# Patient Record
Sex: MALE | Race: WHITE | NOT HISPANIC OR LATINO | Employment: OTHER | URBAN - NONMETROPOLITAN AREA
[De-identification: names, ages, dates, MRNs, and addresses within clinical notes are randomized per-mention and may not be internally consistent; named-entity substitution may affect disease eponyms.]

---

## 2017-03-13 ENCOUNTER — COMMUNICATION - GICH (OUTPATIENT)
Dept: SURGERY | Facility: OTHER | Age: 62
End: 2017-03-13

## 2018-02-14 ENCOUNTER — DOCUMENTATION ONLY (OUTPATIENT)
Dept: FAMILY MEDICINE | Facility: OTHER | Age: 63
End: 2018-02-14

## 2018-02-14 RX ORDER — CHLORAL HYDRATE 500 MG
1 CAPSULE ORAL 3 TIMES DAILY
COMMUNITY
Start: 2013-03-28 | End: 2021-01-26 | Stop reason: DRUGHIGH

## 2018-07-23 NOTE — PROGRESS NOTES
Patient Information     Patient Name  Pepito Harman MRN  [id number] Sex  Male   1955      Letter by Rich Wheeler MD at      Author:  Rich Wheeler MD Service:  (none) Author Type:  (none)    Filed:   Encounter Date:  3/13/2017 Status:  (Other)           Pepito Harman  [id number] [address] [address]          2017    Dear Mr. Harman:    It has come to our attention that you are due for a repeat colonoscopy.  According to our records, your last colonoscopy was done on 3/23/07.  It  is time for your repeat colonoscopy.  Please contact our office at [phone] and we will be happy to set up this colonoscopy for you.  If you have had a colonoscopy since your last one with us, please let us know so we can update our records.      Sincerely,       [facility]      Reviewed and electronically signed by provider

## 2018-10-09 ENCOUNTER — OFFICE VISIT (OUTPATIENT)
Dept: FAMILY MEDICINE | Facility: OTHER | Age: 63
End: 2018-10-09
Payer: COMMERCIAL

## 2018-10-09 VITALS
DIASTOLIC BLOOD PRESSURE: 80 MMHG | WEIGHT: 208.2 LBS | BODY MASS INDEX: 28.23 KG/M2 | HEART RATE: 97 BPM | OXYGEN SATURATION: 93 % | TEMPERATURE: 100.3 F | SYSTOLIC BLOOD PRESSURE: 130 MMHG
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
## 2018-10-09 PROCEDURE — 99202 OFFICE O/P NEW SF 15 MIN: CPT | Performed by: PHYSICIAN ASSISTANT

## 2018-10-09 RX ORDER — DOXYCYCLINE 100 MG/1
100 CAPSULE ORAL 2 TIMES DAILY
Qty: 20 CAPSULE | Refills: 0 | Status: SHIPPED | OUTPATIENT
Start: 2018-10-09 | End: 2019-02-07

## 2018-10-09 NOTE — MR AVS SNAPSHOT
After Visit Summary   10/9/2018    Pepito Harman    MRN: [id number]           Patient Information     Date Of Birth          1955        Visit Information        Provider Department      10/9/2018 7:45 PM Maren Florian PA-C [facility]        Today's Diagnoses     Tick bite, initial encounter    -  1      Care Instructions    Deer tick bite  Posterior left thigh  Will treat as a possible cellulitis versus erythema migrans (Lyme disease)  Doxycycline 100 mg oral tablet, one tablet twice daily for 10 days. Take with food.   Wash area of tick bite with warm soapy water, cover with topical antibiotic ointment 2-3 times/ daily for 7-10 days  Follow up with PCP if symptoms persist or worsen  Seek immediate care for    Current symptoms get worse    Unexplained fever, neck pain or stiffness, or headache    Arm, leg or facial weakness    Joint pain or swelling    Numbness and tingling in the arms or legs    Confusion or memory loss    Irregular or rapid heartbeat      Red areas that spread    Swelling or pain that gets worse    Fluid leaking from the skin (pus)    Fever higher of 100.4  F (38.0  C) or higher after 2 days on antibiotics          Lyme Disease  Lyme disease is caused by bacteria. The infection is most often passed during the bite of a deer tick. The tick is very small, so many people with Lyme disease do not know they have been bitten. Tests for Lyme disease are not always accurate early in the disease. If the disease is suspected, treatment may begin before testing confirms the infection. A long course of antibiotics is the standard treatment.  If untreated, Lyme disease can worsen and full-body symptoms can develop          Early local symptoms may appear within a few days to a month after the tick bite. These symptoms may include a round, red rash that looks like a bull's-eye target with darker outer ring and a darker center. There may fever, chills, fatigue, body  aches, and headache. In time, the rash goes away, even without treatment. That doesn't mean the infection has gone away, however. In some cases, early local symptoms never develop.    Early disseminated symptoms may appear weeks to months after the bite. These can include muscle aches, fatigue, fever, headache, stiff neck, and joint pain and swelling.    Late-stage symptoms include weakness in an arm, leg or one side of the face, headache, fever, and numbness and tingling in the arms or legs, confusion, and memory loss.  Testing is done for the presence of the bacteria. When the infection is treated early, it can be cured. In some cases, a second or third course of antibiotics may be needed. Be sure to follow your healthcare providers directions about treatment.  Home care  If oral antibiotics have been prescribed, take them exactly as directed until they are completely gone. Do not stop taking them until you have taken the full course or your healthcare provider has told you to stop.  Ask your healthcare provider about taking over-the-counter medicines to control symptoms such as aches and fever.  Follow-up care  Follow up with your healthcare provider as advised. Be sure to return for follow-up testing as directed to be sure the infection has been treated.  When to seek medical advice  Call your healthcare provider right away if any of the following occur:    Current symptoms get worse    Unexplained fever, neck pain or stiffness, or headache    Arm, leg or facial weakness    Joint pain or swelling    Numbness and tingling in the arms or legs    Confusion or memory loss    Irregular or rapid heartbeat  Date Last Reviewed: 9/25/2015 2000-2017 [employer]. [address] [place] [address], [place] [address]. All rights reserved. This information is not intended as a substitute for professional medical care. Always follow your healthcare professional's instructions.        Lyme Disease  Lyme disease is caused  by bacteria. The infection is most often passed during the bite of a deer tick. The tick is very small, so many people with Lyme disease do not know they have been bitten. Tests for Lyme disease are not always accurate early in the disease. If the disease is suspected, treatment may begin before testing confirms the infection. A long course of antibiotics is the standard treatment.  If untreated, Lyme disease can worsen and full-body symptoms can develop          Early local symptoms may appear within a few days to a month after the tick bite. These symptoms may include a round, red rash that looks like a bull's-eye target with darker outer ring and a darker center. There may fever, chills, fatigue, body aches, and headache. In time, the rash goes away, even without treatment. That doesn't mean the infection has gone away, however. In some cases, early local symptoms never develop.    Early disseminated symptoms may appear weeks to months after the bite. These can include muscle aches, fatigue, fever, headache, stiff neck, and joint pain and swelling.    Late-stage symptoms include weakness in an arm, leg or one side of the face, headache, fever, and numbness and tingling in the arms or legs, confusion, and memory loss.  Testing is done for the presence of the bacteria. When the infection is treated early, it can be cured. In some cases, a second or third course of antibiotics may be needed. Be sure to follow your healthcare providers directions about treatment.  Home care  If oral antibiotics have been prescribed, take them exactly as directed until they are completely gone. Do not stop taking them until you have taken the full course or your healthcare provider has told you to stop.  Ask your healthcare provider about taking over-the-counter medicines to control symptoms such as aches and fever.  Follow-up care  Follow up with your healthcare provider as advised. Be sure to return for follow-up testing as directed  to be sure the infection has been treated.  When to seek medical advice  Call your healthcare provider right away if any of the following occur:    Current symptoms get worse    Unexplained fever, neck pain or stiffness, or headache    Arm, leg or facial weakness    Joint pain or swelling    Numbness and tingling in the arms or legs    Confusion or memory loss    Irregular or rapid heartbeat  Date Last Reviewed: 9/25/2015 2000-2017 [employer]. [address] [place] [address], [place] [address]. All rights reserved. This information is not intended as a substitute for professional medical care. Always follow your healthcare professional's instructions.        Cellulitis  Cellulitis is an infection of the deep layers of skin. A break in the skin, such as a cut or scratch, can let bacteria under the skin. If the bacteria get to deep layers of the skin, it can be serious. If not treated, cellulitis can get into the bloodstream and lymph nodes. The infection can then spread throughout the body. This causes serious illness.  Cellulitis causes the affected skin to become red, swollen, warm, and sore. The reddened areas have a visible border. An open sore may leak fluid (pus). You may have a fever, chills, and pain.  Cellulitis is treated with antibiotics taken for 7 to 10 days. An open sore may be cleaned and covered with cool wet gauze. Symptoms should get better 1 to 2 days after treatment is started. Make sure to take all the antibiotics for the full number of days until they are gone. Keep taking the medicine even if your symptoms go away.  Home care  Follow these tips:    Limit the use of the part of your body with cellulitis.     If the infection is on your leg, keep your leg raised while sitting. This will help to reduce swelling.    Take all of the antibiotic medicine exactly as directed until it is gone. Do not miss any doses, especially during the first 7 days. Don t stop taking the medicine when your  symptoms get better.    Keep the affected area clean and dry.    Wash your hands with soap and warm water before and after touching your skin. Anyone else who touches your skin should also wash his or her hands. Don't share towels.  Follow-up care  Follow up with your healthcare provider, or as advised. If your infection does not go away on the first antibiotic, your healthcare provider will prescribe a different one.  When to seek medical advice  Call your healthcare provider right away if any of these occur:    Red areas that spread    Swelling or pain that gets worse    Fluid leaking from the skin (pus)    Fever higher of 100.4  F (38.0  C) or higher after 2 days on antibiotics  Date Last Reviewed: 9/1/2016 2000-2017 [employer]. [address] [place] [address], [place] [address]. All rights reserved. This information is not intended as a substitute for professional medical care. Always follow your healthcare professional's instructions.                Follow-ups after your visit        Follow-up notes from your care team     Return if symptoms worsen or fail to improve.      Who to contact     If you have questions or need follow up information about today's clinic visit or your schedule please contact [facility] directly at [phone].  Normal or non-critical lab and imaging results will be communicated to you by MyChart, letter or phone within 4 business days after the clinic has received the results. If you do not hear from us within 7 days, please contact the clinic through MyChart or phone. If you have a critical or abnormal lab result, we will notify you by phone as soon as possible.  Submit refill requests through [employer] or call your pharmacy and they will forward the refill request to us. Please allow 3 business days for your refill to be completed.          Additional Information About Your Visit        Care EveryWhere ID     This is your Care EveryWhere ID. This could  be used by other organizations to access your [place] medical records  [id number]        Your Vitals Were     Pulse Temperature Pulse Oximetry BMI (Body Mass Index)          97 100.3  F (37.9  C) (Tympanic) 93% 28.23 kg/m2         Blood Pressure from Last 3 Encounters:   10/09/18 130/80   03/17/15 130/90   12/19/14 120/80    Weight from Last 3 Encounters:   10/09/18 208 lb 3.2 oz (94.4 kg)   03/17/15 210 lb (95.3 kg)   11/21/14 201 lb 14.4 oz (91.6 kg)              Today, you had the following     No orders found for display         Today's Medication Changes          These changes are accurate as of 10/9/18  8:15 PM.  If you have any questions, ask your nurse or doctor.               Start taking these medicines.        Dose/Directions    doxycycline monohydrate 100 MG capsule   Used for:  Tick bite, initial encounter   Started by:  Maren Florian PA-C        Dose:  100 mg   Take 1 capsule (100 mg) by mouth 2 times daily for 10 days   Quantity:  20 capsule   Refills:  0            Where to get your medicines      These medications were sent to [id number] - [place], [place] - [address]  [address], [facility] [address]     Phone:  [phone]     doxycycline monohydrate 100 MG capsule                Primary Care Provider Office Phone # Fax #    Bo Cortes [phone]       [address] [facility] [address]        Equal Access to Services     [facility] AH: Hadii aad ku hadasho Soomaali, waaxda luqadaha, qaybta kaalmada adeegyada, catalina roach. So [facility] [place] [facility] [phone].    ATENCIÓN: Si habla español, tiene a vasquez disposición servicios gratuitos de asistencia lingüística. Llame al [phone].    We comply with applicable federal civil rights laws and [place] laws. We do not discriminate on the basis of race, color, national origin, age, disability, sex, sexual orientation, or gender identity.             Thank you!     Thank you for choosing [facility]  for your care. Our goal is always to provide you with excellent care. Hearing back from our patients is one way we can continue to improve our services. Please take a few minutes to complete the written survey that you may receive in the mail after your visit with us. Thank you!             Your Updated Medication List - Protect others around you: Learn how to safely use, store and throw away your medicines at www.disposemymeds.org.          This list is accurate as of 10/9/18  8:15 PM.  Always use your most recent med list.                   Brand Name Dispense Instructions for use Diagnosis    ASPIRIN 81 PO      Take 81 mg by mouth daily with food        doxycycline monohydrate 100 MG capsule     20 capsule    Take 1 capsule (100 mg) by mouth 2 times daily for 10 days    Tick bite, initial encounter       fish oil-omega-3 fatty acids 1000 MG capsule      Take 1 capsule by mouth

## 2018-10-10 NOTE — PATIENT INSTRUCTIONS
Deer tick bite  Posterior left thigh  Will treat as a possible cellulitis versus erythema migrans (Lyme disease)  Doxycycline 100 mg oral tablet, one tablet twice daily for 10 days. Take with food.   Wash area of tick bite with warm soapy water, cover with topical antibiotic ointment 2-3 times/ daily for 7-10 days  Follow up with PCP if symptoms persist or worsen  Seek immediate care for    Current symptoms get worse    Unexplained fever, neck pain or stiffness, or headache    Arm, leg or facial weakness    Joint pain or swelling    Numbness and tingling in the arms or legs    Confusion or memory loss    Irregular or rapid heartbeat      Red areas that spread    Swelling or pain that gets worse    Fluid leaking from the skin (pus)    Fever higher of 100.4  F (38.0  C) or higher after 2 days on antibiotics          Lyme Disease  Lyme disease is caused by bacteria. The infection is most often passed during the bite of a deer tick. The tick is very small, so many people with Lyme disease do not know they have been bitten. Tests for Lyme disease are not always accurate early in the disease. If the disease is suspected, treatment may begin before testing confirms the infection. A long course of antibiotics is the standard treatment.  If untreated, Lyme disease can worsen and full-body symptoms can develop          Early local symptoms may appear within a few days to a month after the tick bite. These symptoms may include a round, red rash that looks like a bull's-eye target with darker outer ring and a darker center. There may fever, chills, fatigue, body aches, and headache. In time, the rash goes away, even without treatment. That doesn't mean the infection has gone away, however. In some cases, early local symptoms never develop.    Early disseminated symptoms may appear weeks to months after the bite. These can include muscle aches, fatigue, fever, headache, stiff neck, and joint pain and swelling.    Late-stage  symptoms include weakness in an arm, leg or one side of the face, headache, fever, and numbness and tingling in the arms or legs, confusion, and memory loss.  Testing is done for the presence of the bacteria. When the infection is treated early, it can be cured. In some cases, a second or third course of antibiotics may be needed. Be sure to follow your healthcare providers directions about treatment.  Home care  If oral antibiotics have been prescribed, take them exactly as directed until they are completely gone. Do not stop taking them until you have taken the full course or your healthcare provider has told you to stop.  Ask your healthcare provider about taking over-the-counter medicines to control symptoms such as aches and fever.  Follow-up care  Follow up with your healthcare provider as advised. Be sure to return for follow-up testing as directed to be sure the infection has been treated.  When to seek medical advice  Call your healthcare provider right away if any of the following occur:    Current symptoms get worse    Unexplained fever, neck pain or stiffness, or headache    Arm, leg or facial weakness    Joint pain or swelling    Numbness and tingling in the arms or legs    Confusion or memory loss    Irregular or rapid heartbeat  Date Last Reviewed: 9/25/2015 2000-2017 [employer]. [address] [place] [address]. All rights reserved. This information is not intended as a substitute for professional medical care. Always follow your healthcare professional's instructions.        Lyme Disease  Lyme disease is caused by bacteria. The infection is most often passed during the bite of a deer tick. The tick is very small, so many people with Lyme disease do not know they have been bitten. Tests for Lyme disease are not always accurate early in the disease. If the disease is suspected, treatment may begin before testing confirms the infection. A long course of antibiotics is the  standard treatment.  If untreated, Lyme disease can worsen and full-body symptoms can develop          Early local symptoms may appear within a few days to a month after the tick bite. These symptoms may include a round, red rash that looks like a bull's-eye target with darker outer ring and a darker center. There may fever, chills, fatigue, body aches, and headache. In time, the rash goes away, even without treatment. That doesn't mean the infection has gone away, however. In some cases, early local symptoms never develop.    Early disseminated symptoms may appear weeks to months after the bite. These can include muscle aches, fatigue, fever, headache, stiff neck, and joint pain and swelling.    Late-stage symptoms include weakness in an arm, leg or one side of the face, headache, fever, and numbness and tingling in the arms or legs, confusion, and memory loss.  Testing is done for the presence of the bacteria. When the infection is treated early, it can be cured. In some cases, a second or third course of antibiotics may be needed. Be sure to follow your healthcare providers directions about treatment.  Home care  If oral antibiotics have been prescribed, take them exactly as directed until they are completely gone. Do not stop taking them until you have taken the full course or your healthcare provider has told you to stop.  Ask your healthcare provider about taking over-the-counter medicines to control symptoms such as aches and fever.  Follow-up care  Follow up with your healthcare provider as advised. Be sure to return for follow-up testing as directed to be sure the infection has been treated.  When to seek medical advice  Call your healthcare provider right away if any of the following occur:    Current symptoms get worse    Unexplained fever, neck pain or stiffness, or headache    Arm, leg or facial weakness    Joint pain or swelling    Numbness and tingling in the arms or legs    Confusion or memory  loss    Irregular or rapid heartbeat  Date Last Reviewed: 9/25/2015 2000-2017 [employer]. [address] [place] [address], [place] [address]. All rights reserved. This information is not intended as a substitute for professional medical care. Always follow your healthcare professional's instructions.        Cellulitis  Cellulitis is an infection of the deep layers of skin. A break in the skin, such as a cut or scratch, can let bacteria under the skin. If the bacteria get to deep layers of the skin, it can be serious. If not treated, cellulitis can get into the bloodstream and lymph nodes. The infection can then spread throughout the body. This causes serious illness.  Cellulitis causes the affected skin to become red, swollen, warm, and sore. The reddened areas have a visible border. An open sore may leak fluid (pus). You may have a fever, chills, and pain.  Cellulitis is treated with antibiotics taken for 7 to 10 days. An open sore may be cleaned and covered with cool wet gauze. Symptoms should get better 1 to 2 days after treatment is started. Make sure to take all the antibiotics for the full number of days until they are gone. Keep taking the medicine even if your symptoms go away.  Home care  Follow these tips:    Limit the use of the part of your body with cellulitis.     If the infection is on your leg, keep your leg raised while sitting. This will help to reduce swelling.    Take all of the antibiotic medicine exactly as directed until it is gone. Do not miss any doses, especially during the first 7 days. Don t stop taking the medicine when your symptoms get better.    Keep the affected area clean and dry.    Wash your hands with soap and warm water before and after touching your skin. Anyone else who touches your skin should also wash his or her hands. Don't share towels.  Follow-up care  Follow up with your healthcare provider, or as advised. If your infection does not go away on the first  antibiotic, your healthcare provider will prescribe a different one.  When to seek medical advice  Call your healthcare provider right away if any of these occur:    Red areas that spread    Swelling or pain that gets worse    Fluid leaking from the skin (pus)    Fever higher of 100.4  F (38.0  C) or higher after 2 days on antibiotics  Date Last Reviewed: 9/1/2016 2000-2017 [employer]. [address] [place] [address]. All rights reserved. This information is not intended as a substitute for professional medical care. Always follow your healthcare professional's instructions.

## 2018-10-10 NOTE — NURSING NOTE
Patient presents to clinic today for possible lyme's. patient states his wife pulled off a tick a week ago. Patient's left leg is swollen and has a large area of irritation on it.   Snehal Gandara CMA..............10/9/2018........7:52 PM    Medication Reconciliation: complete    Snehal Gandara CMA

## 2018-10-10 NOTE — PROGRESS NOTES
SUBJECTIVE:  Pepito Harman is a 63 year old male who is here because of a tick bite.   The tick was first noticed on the posterior distal left thigh. Onset 1 week ago. Tick was very small and was removed.     Associated symptoms:  Rash: on back of thigh where tick was removed  Fever: temperature 100.3 in clinic, denies chills and sweats  Other symptoms:   Headache negative  Sore throat no  Fatigue no  Nausea/vomiting no  Muscle aches - yes, he thinks this is from his job, not a new symptom.   Joint pain no  Swollen lymph glands no  Stiff neck no      ROS:  As above    OBJECTIVE:  Nontoxic male in no acute distress.  /80  Pulse 97  Temp 100.3  F (37.9  C) (Tympanic)  Wt 208 lb 3.2 oz (94.4 kg)  SpO2 93%  BMI 28.23 kg/m2    Rash description:  Posterior left thigh - 9 x 6 cm area of erythema. Area is indurated and warm. 4 cm central area that is also erythematous, raised, bumpy. Mildly TTP.   Neurological: Alert, normal gait, cranial nerves grossly intact. Strength U/L extremity grossly intact.       ASSESSMENT / IMPRESSION:  (W57.XXXA) Tick bite, initial encounter  (primary encounter diagnosis)    Plan: doxycycline monohydrate 100 MG capsule    Deer tick bite  Posterior left thigh  Will treat as a possible cellulitis versus erythema migrans (Lyme disease)  Doxycycline 100 mg oral tablet, one tablet twice daily for 10 days. Take with food.   Wash area of tick bite with warm soapy water, cover with topical antibiotic ointment 2-3 times/ daily for 7-10 days  Follow up with PCP if symptoms persist or worsen  Patient received verbal and written instruction including review of warning signs    aMren Florian PA-C on 10/9/2018 at 8:44 PM

## 2019-02-08 ENCOUNTER — OFFICE VISIT (OUTPATIENT)
Dept: INTERNAL MEDICINE | Facility: OTHER | Age: 64
End: 2019-02-08
Attending: INTERNAL MEDICINE
Payer: COMMERCIAL

## 2019-02-08 VITALS
TEMPERATURE: 96 F | DIASTOLIC BLOOD PRESSURE: 80 MMHG | HEART RATE: 68 BPM | SYSTOLIC BLOOD PRESSURE: 134 MMHG | RESPIRATION RATE: 16 BRPM | WEIGHT: 203 LBS | HEIGHT: 72 IN | BODY MASS INDEX: 27.5 KG/M2

## 2019-02-08 DIAGNOSIS — E78.1 HIGH TRIGLYCERIDES: ICD-10-CM

## 2019-02-08 DIAGNOSIS — E78.2 MIXED HYPERLIPIDEMIA: Primary | ICD-10-CM

## 2019-02-08 PROCEDURE — 99214 OFFICE O/P EST MOD 30 MIN: CPT | Performed by: INTERNAL MEDICINE

## 2019-02-08 RX ORDER — ATORVASTATIN CALCIUM 20 MG/1
20 TABLET, FILM COATED ORAL DAILY
Qty: 90 TABLET | Refills: 3 | Status: SHIPPED | OUTPATIENT
Start: 2019-02-08 | End: 2020-01-27

## 2019-02-08 RX ORDER — OMEGA-3-ACID ETHYL ESTERS 1 G/1
2 CAPSULE, LIQUID FILLED ORAL 2 TIMES DAILY
Qty: 360 CAPSULE | Refills: 3 | Status: SHIPPED | OUTPATIENT
Start: 2019-02-08 | End: 2020-02-13

## 2019-02-08 ASSESSMENT — ENCOUNTER SYMPTOMS
DYSURIA: 0
FEVER: 0
ARTHRALGIAS: 0
PALPITATIONS: 0
DIZZINESS: 0
COUGH: 0
AGITATION: 0
SHORTNESS OF BREATH: 0
LIGHT-HEADEDNESS: 0
WHEEZING: 0
WOUND: 0
ABDOMINAL PAIN: 0
DIARRHEA: 0
MYALGIAS: 0
HEMATURIA: 0
NAUSEA: 0
CONFUSION: 0
BRUISES/BLEEDS EASILY: 0
VOMITING: 0
CHILLS: 0

## 2019-02-08 ASSESSMENT — ANXIETY QUESTIONNAIRES
7. FEELING AFRAID AS IF SOMETHING AWFUL MIGHT HAPPEN: NOT AT ALL
1. FEELING NERVOUS, ANXIOUS, OR ON EDGE: NOT AT ALL
3. WORRYING TOO MUCH ABOUT DIFFERENT THINGS: NOT AT ALL
IF YOU CHECKED OFF ANY PROBLEMS ON THIS QUESTIONNAIRE, HOW DIFFICULT HAVE THESE PROBLEMS MADE IT FOR YOU TO DO YOUR WORK, TAKE CARE OF THINGS AT HOME, OR GET ALONG WITH OTHER PEOPLE: NOT DIFFICULT AT ALL
2. NOT BEING ABLE TO STOP OR CONTROL WORRYING: NOT AT ALL
6. BECOMING EASILY ANNOYED OR IRRITABLE: NOT AT ALL
GAD7 TOTAL SCORE: 0
5. BEING SO RESTLESS THAT IT IS HARD TO SIT STILL: NOT AT ALL

## 2019-02-08 ASSESSMENT — PATIENT HEALTH QUESTIONNAIRE - PHQ9
SUM OF ALL RESPONSES TO PHQ QUESTIONS 1-9: 0
5. POOR APPETITE OR OVEREATING: NOT AT ALL

## 2019-02-08 ASSESSMENT — PAIN SCALES - GENERAL: PAINLEVEL: NO PAIN (0)

## 2019-02-08 ASSESSMENT — MIFFLIN-ST. JEOR: SCORE: 1754.83

## 2019-02-08 NOTE — PATIENT INSTRUCTIONS
To help with weight loss and improve blood sugar control....    -- Try to avoid Carbohydrates as much as possible -- breads, pasta, baked goods, cakes, oatmeal, cold cereal, potatoes.   These are turned to sugar in one metabolic conversion, cause insulin secretion and increased fat deposition / weight gain.      -- Eat more lean meats, proteins, eggs, nuts, vegetables.       1. Mixed hyperlipidemia  START:   - atorvastatin (LIPITOR) 20 MG tablet; Take 1 tablet (20 mg) by mouth daily  Dispense: 90 tablet; Refill: 3  - omega-3 acid ethyl esters (LOVAZA) 1 g capsule; Take 2 capsules (2 g) by mouth 2 times daily  Dispense: 360 capsule; Refill: 3    2. High triglycerides  START:   - omega-3 acid ethyl esters (LOVAZA) 1 g capsule; Take 2 capsules (2 g) by mouth 2 times daily  Dispense: 360 capsule; Refill: 3    -- consider plant based Omega-3.. OTC okay.       Can recheck labs in 2 to 3 months. (Lab only).      After discussion of the treatment of hyperlipidemia, a statin-drug is chosen; prescription for Lipitor once daily is written.     The patient is informed that this type of drug is usually highly effective to lower LDL cholesterol and is usually very well tolerated. However, statin-type drugs can potentially injure theliver. Blood tests will be required every 3 months for the first 6 months of therapy, and at 6-12 month intervals thereafter.  Damage to the liver, if detected early, can be reversed by stopping the drug.  Be alert forpersistent nausea, abdominal pain, or yellow jaundice, and report such to me directly. Statin drugs may also cause skeletal muscle injury in rare cases. Be alert for pronounced persistent diffuse muscle pain and discontinuethe drug immediately should such symptoms develop.     Statins can cause myalgias or muscle aches, it is rare to have severe muscle aches --- mild aches/pains are more common.  -- Approximately 70% of the country has low vitamin D levels.  -- If you develop aches and  pains in the muscles after starting Statin medications; Recommend discontinuation of the statin temporarily, start taking Vitamin D3 - 2000 up to 5000 units daily for 2-4 weeks, then restart the Statin medication, but just 2 or 3 times weekly - increase statin to every evening if tolerated.    Nearly 70% of people are able to tolerate statin medications, once their Vitamin D levels are normalized.    - Coenzyme Q10 (CO Q-10) 200 to 500 mg capsule (400 to 500 mg daily)    - All-cause cardiovascular mortality was lower, in people taking this medication while on cholesterol medication.     -May also help improve or prevent muscle aches/myalgias from Statin medications.      Return in approximately 1 year, or sooner as needed for follow-up with Dr. Cortes.  - Annual Follow-up / Physical    Clinic : [phone]  Appointment line: [phone]

## 2019-02-08 NOTE — PROGRESS NOTES
"Nursing Notes:   Lou Pacheco LPN  2/8/2019  9:23 AM  Signed  Patient presents to the clinic for medication management.    Chief Complaint   Patient presents with     Recheck Medication       Initial /80 (BP Location: Right arm, Patient Position: Sitting, Cuff Size: Adult Regular)   Pulse 68   Temp 96  F (35.6  C) (Tympanic)   Resp 16   Ht 1.822 m (5' 11.75\")   Wt 92.1 kg (203 lb)   BMI 27.72 kg/m    Estimated body mass index is 27.72 kg/m  as calculated from the following:    Height as of this encounter: 1.822 m (5' 11.75\").    Weight as of this encounter: 92.1 kg (203 lb).  Medication Reconciliation: complete    Lou Pacheco LPN    Nursing note reviewed with patient.  Accuracy and completeness verified.   Mr. Harman is a 64 year old male who:  Patient presents with:  Recheck Medication      ICD-10-CM    1. Mixed hyperlipidemia E78.2 atorvastatin (LIPITOR) 20 MG tablet     omega-3 acid ethyl esters (LOVAZA) 1 g capsule     Lipid Profile     Comprehensive metabolic panel   2. High triglycerides E78.1 omega-3 acid ethyl esters (LOVAZA) 1 g capsule     Lipid Profile     HPI  Patient presents to establish care.    He gets a free physical examination through his insurance once a year, he does had a bunch of lab work done in January.  He is here today to discuss some of his cholesterol results.  His triglycerides were nearly 400.  LDL was 123.  HDL was 35.  Total cholesterol was high.    His cardiac risk calculation came back high and he is wanting to try getting this down.    Had hemoglobin A1c that was 5.2% recently.    At one point was on a low-carb diet and that really help cut down on his triglyceride levels.  He is going to continue working on low-carb diet but would also like to get started on some medications.  Discussed OTC linseed oil, versus prescription Lovaza/omega-3.  We also discussed initiation of Lipitor.  Prescription sent to pharmacy.  He will schedule lab only appointment in 2-3 " months.    Functional Capacity: > 4 METS.   Reports that he can climb a flight of stairs without any chest pain/heaviness or shortness of breath.   No orthopnea/paroxysmal nocturnal dyspnea  Review of Systems   Constitutional: Negative for chills and fever.   HENT: Negative for congestion and hearing loss.    Eyes: Negative for visual disturbance.   Respiratory: Negative for cough, shortness of breath and wheezing.    Cardiovascular: Negative for chest pain and palpitations.   Gastrointestinal: Negative for abdominal pain, diarrhea, nausea and vomiting.   Endocrine: Negative for cold intolerance and heat intolerance.   Genitourinary: Negative for dysuria and hematuria.   Musculoskeletal: Negative for arthralgias and myalgias.   Skin: Negative for rash and wound.   Allergic/Immunologic: Negative for immunocompromised state.   Neurological: Negative for dizziness and light-headedness.   Hematological: Does not bruise/bleed easily.   Psychiatric/Behavioral: Negative for agitation and confusion.      CAROL:   CAROL-7 SCORE 2/8/2019   Total Score 0     PHQ9:  PHQ-9 SCORE 2/8/2019   PHQ-9 Total Score 0       I have personally reviewed the past medical history, past surgical history, medications, allergies, family and social history as listed below.     Allergies   Allergen Reactions     Penicillins Rash     Childhood rash       Current Outpatient Medications   Medication Sig Dispense Refill     ASPIRIN 81 PO Take 81 mg by mouth daily with food       atorvastatin (LIPITOR) 20 MG tablet Take 1 tablet (20 mg) by mouth daily 90 tablet 3     fish oil-omega-3 fatty acids 1000 MG capsule Take 1 capsule by mouth       omega-3 acid ethyl esters (LOVAZA) 1 g capsule Take 2 capsules (2 g) by mouth 2 times daily 360 capsule 3        Patient Active Problem List    Diagnosis Date Noted     S/P shoulder surgery 12/15/2014     Priority: Medium     AC (acromioclavicular) joint arthritis 11/20/2014     Priority: Medium     Impingement syndrome  "of shoulder 11/20/2014     Priority: Medium     Rotator cuff tear 11/20/2014     Priority: Medium     Mixed hyperlipidemia 03/27/2013     Priority: Medium     Past Medical History:   Diagnosis Date     Affections of shoulder region     11/20/2014     Arthropathy of shoulder region     11/20/2014     Other postprocedural states     12/15/2014     Sprain of rotator cuff capsule     11/20/2014     Past Surgical History:   Procedure Laterality Date     COLONOSCOPY  03/23/2007    f/u 10 years     EXTRACTION(S) DENTAL      No Comments Provided     OTHER SURGICAL HISTORY      12/15/2014,333370,OTHER,Right     RELEASE CARPAL TUNNEL      No Comments Provided     Social History     Socioeconomic History     Marital status:      Spouse name: Candy     Number of children: None     Years of education: None     Highest education level: None   Social Needs     Financial resource strain: None     Food insecurity - worry: None     Food insecurity - inability: None     Transportation needs - medical: None     Transportation needs - non-medical: None   Occupational History     None   Tobacco Use     Smoking status: Never Smoker     Smokeless tobacco: Never Used   Substance and Sexual Activity     Alcohol use: No     Drug use: No     Sexual activity: No   Other Topics Concern     Parent/sibling w/ CABG, MI or angioplasty before 65F 55M? Not Asked   Social History Narrative     - Wife Candy    4 children.     Family History   Problem Relation Age of Onset     Genetic Disorder Other         Genetic,No FHx of DM       EXAM:   Vitals:    02/08/19 0909   BP: 134/80   BP Location: Right arm   Patient Position: Sitting   Cuff Size: Adult Regular   Pulse: 68   Resp: 16   Temp: 96  F (35.6  C)   TempSrc: Tympanic   Weight: 92.1 kg (203 lb)   Height: 1.822 m (5' 11.75\")       Current Pain Score: No Pain (0)     BP Readings from Last 3 Encounters:   02/08/19 134/80   10/09/18 130/80   03/17/15 130/90      Wt Readings from Last 3 " "Encounters:   02/08/19 92.1 kg (203 lb)   10/09/18 94.4 kg (208 lb 3.2 oz)   03/17/15 95.3 kg (209 lb 16 oz)      Estimated body mass index is 27.72 kg/m  as calculated from the following:    Height as of this encounter: 1.822 m (5' 11.75\").    Weight as of this encounter: 92.1 kg (203 lb).     Physical Exam   Constitutional: He appears well-developed and well-nourished. No distress.   HENT:   Head: Normocephalic and atraumatic.   Eyes: Conjunctivae and EOM are normal. No scleral icterus.   Neck: No thyromegaly present.   Cardiovascular: Normal rate and regular rhythm.   Pulmonary/Chest: Effort normal. No respiratory distress. He has no wheezes.   Abdominal: Soft. There is no tenderness.   Musculoskeletal: He exhibits no tenderness or deformity.   Lymphadenopathy:     He has no cervical adenopathy.   Neurological: He is alert. No cranial nerve deficit.   Skin: Skin is warm and dry.   Psychiatric: He has a normal mood and affect.      Procedures   INVESTIGATIONS:  Results for orders placed or performed in visit on 03/17/15   Lipid Profile   Result Value Ref Range    LDL Cholesterol Calculated 166 (H) <100 mg/dL    Cholesterol/HDL Ratio - Historical 6.40 (H) <4.50    Patient Status - Historical NOT GIVEN     Triglycerides 113 <150 mg/dL    Cholesterol Total 224 (H) <200 mg/dL    Non-HDL Cholesterol - Historical 189 (H) <145 mg/dL    HDL Cholesterol 35 23 - 92 mg/dL   Glucose   Result Value Ref Range    Glucose 99 70 - 105 mg/dL   Hemoglobin A1c POCT   Result Value Ref Range    Estimated Average Glucose 100 mg/dL    Hemoglobin A1C Monitoring - Historical 5.1 4.0 - 6.2 %    Narrative         (<=6.9%)           Indicates good control       (7.0% to 7.9%)     Indicates fair control       (>=8.0%)           Indicates poor control     NOTE:  These thresholds are guidelines and            individual targets may vary.       ASSESSMENT AND PLAN:  Problem List Items Addressed This Visit        Endocrine    Mixed hyperlipidemia " - Primary    Relevant Medications    atorvastatin (LIPITOR) 20 MG tablet    omega-3 acid ethyl esters (LOVAZA) 1 g capsule    Other Relevant Orders    Lipid Profile    Comprehensive metabolic panel      Other Visit Diagnoses     High triglycerides        Relevant Medications    atorvastatin (LIPITOR) 20 MG tablet    omega-3 acid ethyl esters (LOVAZA) 1 g capsule    Other Relevant Orders    Lipid Profile        reviewed diet, exercise and weight control, cardiovascular risk and specific lipid/LDL goals reviewed  -- Expected clinical course discussed    -- Medications and their side effects discussed    Patient Instructions   To help with weight loss and improve blood sugar control....    -- Try to avoid Carbohydrates as much as possible -- breads, pasta, baked goods, cakes, oatmeal, cold cereal, potatoes.   These are turned to sugar in one metabolic conversion, cause insulin secretion and increased fat deposition / weight gain.      -- Eat more lean meats, proteins, eggs, nuts, vegetables.       1. Mixed hyperlipidemia  START:   - atorvastatin (LIPITOR) 20 MG tablet; Take 1 tablet (20 mg) by mouth daily  Dispense: 90 tablet; Refill: 3  - omega-3 acid ethyl esters (LOVAZA) 1 g capsule; Take 2 capsules (2 g) by mouth 2 times daily  Dispense: 360 capsule; Refill: 3    2. High triglycerides  START:   - omega-3 acid ethyl esters (LOVAZA) 1 g capsule; Take 2 capsules (2 g) by mouth 2 times daily  Dispense: 360 capsule; Refill: 3    -- consider plant based Omega-3.. OTC okay.       Can recheck labs in 2 to 3 months. (Lab only).      After discussion of the treatment of hyperlipidemia, a statin-drug is chosen; prescription for Lipitor once daily is written.     The patient is informed that this type of drug is usually highly effective to lower LDL cholesterol and is usually very well tolerated. However, statin-type drugs can potentially injure theliver. Blood tests will be required every 3 months for the first 6 months of  therapy, and at 6-12 month intervals thereafter.  Damage to the liver, if detected early, can be reversed by stopping the drug.  Be alert forpersistent nausea, abdominal pain, or yellow jaundice, and report such to me directly. Statin drugs may also cause skeletal muscle injury in rare cases. Be alert for pronounced persistent diffuse muscle pain and discontinuethe drug immediately should such symptoms develop.     Statins can cause myalgias or muscle aches, it is rare to have severe muscle aches --- mild aches/pains are more common.  -- Approximately 70% of the country has low vitamin D levels.  -- If you develop aches and pains in the muscles after starting Statin medications; Recommend discontinuation of the statin temporarily, start taking Vitamin D3 - 2000 up to 5000 units daily for 2-4 weeks, then restart the Statin medication, but just 2 or 3 times weekly - increase statin to every evening if tolerated.    Nearly 70% of people are able to tolerate statin medications, once their Vitamin D levels are normalized.    - Coenzyme Q10 (CO Q-10) 200 to 500 mg capsule (400 to 500 mg daily)    - All-cause cardiovascular mortality was lower, in people taking this medication while on cholesterol medication.     -May also help improve or prevent muscle aches/myalgias from Statin medications.      Return in approximately 1 year, or sooner as needed for follow-up with Dr. Cortes.  - Annual Follow-up / Physical    Clinic : [phone]  Appointment line: [phone]      Bo Cortes MD  Internal Medicine  [facility]     Portions of this note were dictated using speech recognition software. The note has been proofread but errors in the text may have been overlooked. Please contact me if there are any concerns regarding the accuracy of the dictation.

## 2019-02-08 NOTE — NURSING NOTE
"Patient presents to the clinic for medication management.    Chief Complaint   Patient presents with     Recheck Medication       Initial /80 (BP Location: Right arm, Patient Position: Sitting, Cuff Size: Adult Regular)   Pulse 68   Temp 96  F (35.6  C) (Tympanic)   Resp 16   Ht 1.822 m (5' 11.75\")   Wt 92.1 kg (203 lb)   BMI 27.72 kg/m   Estimated body mass index is 27.72 kg/m  as calculated from the following:    Height as of this encounter: 1.822 m (5' 11.75\").    Weight as of this encounter: 92.1 kg (203 lb).  Medication Reconciliation: complete    Lou Pacheco LPN    "

## 2019-02-09 ASSESSMENT — ANXIETY QUESTIONNAIRES: GAD7 TOTAL SCORE: 0

## 2019-05-02 DIAGNOSIS — E78.1 HIGH TRIGLYCERIDES: ICD-10-CM

## 2019-05-02 DIAGNOSIS — E78.2 MIXED HYPERLIPIDEMIA: ICD-10-CM

## 2019-05-02 LAB
ALBUMIN SERPL-MCNC: 4.3 G/DL (ref 3.5–5.7)
ALP SERPL-CCNC: 62 U/L (ref 34–104)
ALT SERPL W P-5'-P-CCNC: 48 U/L (ref 7–52)
ANION GAP SERPL CALCULATED.3IONS-SCNC: 4 MMOL/L (ref 3–14)
AST SERPL W P-5'-P-CCNC: 30 U/L (ref 13–39)
BILIRUB SERPL-MCNC: 0.8 MG/DL (ref 0.3–1)
BUN SERPL-MCNC: 23 MG/DL (ref 7–25)
CALCIUM SERPL-MCNC: 9.2 MG/DL (ref 8.6–10.3)
CHLORIDE SERPL-SCNC: 103 MMOL/L (ref 98–107)
CHOLEST SERPL-MCNC: 149 MG/DL
CO2 SERPL-SCNC: 31 MMOL/L (ref 21–31)
CREAT SERPL-MCNC: 0.98 MG/DL (ref 0.7–1.3)
GFR SERPL CREATININE-BSD FRML MDRD: 78 ML/MIN/{1.73_M2}
GLUCOSE SERPL-MCNC: 105 MG/DL (ref 70–105)
HDLC SERPL-MCNC: 38 MG/DL (ref 23–92)
LDLC SERPL CALC-MCNC: 92 MG/DL
NONHDLC SERPL-MCNC: 111 MG/DL
POTASSIUM SERPL-SCNC: 4.7 MMOL/L (ref 3.5–5.1)
PROT SERPL-MCNC: 6.9 G/DL (ref 6.4–8.9)
SODIUM SERPL-SCNC: 138 MMOL/L (ref 134–144)
TRIGL SERPL-MCNC: 96 MG/DL

## 2019-05-02 PROCEDURE — 80053 COMPREHEN METABOLIC PANEL: CPT | Performed by: INTERNAL MEDICINE

## 2019-05-02 PROCEDURE — 80061 LIPID PANEL: CPT | Performed by: INTERNAL MEDICINE

## 2019-05-02 PROCEDURE — 36415 COLL VENOUS BLD VENIPUNCTURE: CPT | Performed by: INTERNAL MEDICINE

## 2020-01-26 DIAGNOSIS — E78.2 MIXED HYPERLIPIDEMIA: ICD-10-CM

## 2020-01-27 RX ORDER — ATORVASTATIN CALCIUM 20 MG/1
TABLET, FILM COATED ORAL
Qty: 90 TABLET | Refills: 0 | Status: SHIPPED | OUTPATIENT
Start: 2020-01-27 | End: 2020-02-13

## 2020-01-27 NOTE — TELEPHONE ENCOUNTER
Prescription approved per [facility] Refill Protocol.  LOV 2/8/19  Brittany Lugo RN on 1/27/2020 at 2:49 PM

## 2020-02-05 DIAGNOSIS — E78.2 MIXED HYPERLIPIDEMIA: ICD-10-CM

## 2020-02-06 RX ORDER — ATORVASTATIN CALCIUM 20 MG/1
TABLET, FILM COATED ORAL
Qty: 90 TABLET | Refills: 0 | OUTPATIENT
Start: 2020-02-06

## 2020-02-06 NOTE — TELEPHONE ENCOUNTER
Osiris GRIFFIN sent Rx request for the following:      ATORVASTATIN 20MG TABLETS  Sig: TAKE 1 TABLET BY MOUTH ONCE DAILY      Last Prescription Date:   1/27/2020  Last Fill Qty/Refills:         90, R-0    Last Office Visit:              2/8/2019   Future Office visit:           none      Redundant refill request refused: Too soon.    Austin Chino RN, BSN  ....................  2/6/2020   10:09 AM

## 2020-02-12 NOTE — PROGRESS NOTES
"Nursing Notes:   Lou Pacheco LPN  2/13/2020  9:05 AM  Signed  Patient presents to the clinic for medication management.    Chief Complaint   Patient presents with     Recheck Medication       Initial /70 (BP Location: Right arm, Patient Position: Sitting, Cuff Size: Adult Regular)   Pulse 64   Temp 96  F (35.6  C) (Tympanic)   Resp 18   Ht 1.81 m (5' 11.25\")   Wt 95.4 kg (210 lb 6 oz)   BMI 29.14 kg/m    Estimated body mass index is 29.14 kg/m  as calculated from the following:    Height as of this encounter: 1.81 m (5' 11.25\").    Weight as of this encounter: 95.4 kg (210 lb 6 oz).  Medication Reconciliation: complete    Lou Pacheco LPN    Nursing note reviewed with patient.  Accuracy and completeness verified.   Mr. Harman is a 65 year old male who:  Patient presents with:  Recheck Medication      ICD-10-CM    1. Annual physical exam Z00.00    2. Mixed hyperlipidemia E78.2 atorvastatin (LIPITOR) 20 MG tablet   3. Elevated random blood glucose level R73.09 Hemoglobin A1c     CBC with platelets   4. Screening PSA (prostate specific antigen) Z12.5 PSA Screen GH     PSA Screen GH   5. Elevated serum creatinine R79.89    6. Encounter for hepatitis C screening test for low risk patient Z11.59 Hepatitis C Screen Reflex to HCV RNA Quant and Genotype     HPI  Patient presents for annual physical.  States that he gets lab work and a physical exam done through his insurance yearly.  He wants to try following up on a Every 2-year basis, unless he has issues, advised that this would be fine.    Mixed hyperlipidemia, currently on Lipitor 20 mg daily.  He ran out of medications about a week ago.  Labs were collected this morning.  LDL did go up slightly.  He continues on omega-3 fatty acids 3000 mg daily.  Doing well with current medication regimen.  Liver enzymes are normal.  Refills today.    Plan for refills in 1 year and he will follow-up in 2 years unless there are acute issues.    Elevated random " glucose, states his hemoglobin A1c was normal.  He declines adding this onto his lab work today.    Screening PSA today, denies urinary health issues.    Elevated creatinine was noted on lab work today.  He states that his urine in the morning is quite dark but is typically better throughout the day as he is drinking more water.  Advised that he try drinking more liquids throughout the day.    Hepatitis C screening labs today.    Declines flu shot.   + Gets annual stool guaiac testing through his insurance.    Functional Capacity: > 4 METS.   Review of Systems   Constitutional: Negative for chills and fever.   HENT: Negative for congestion and hearing loss.    Eyes: Negative for visual disturbance.   Respiratory: Negative for cough, shortness of breath and wheezing.    Cardiovascular: Negative for chest pain and palpitations.   Gastrointestinal: Negative for abdominal pain, diarrhea, nausea and vomiting.   Endocrine: Negative for cold intolerance and heat intolerance.   Genitourinary: Negative for dysuria and hematuria.   Musculoskeletal: Negative for arthralgias and myalgias.   Skin: Negative for rash and wound.   Allergic/Immunologic: Negative for immunocompromised state.   Neurological: Negative for dizziness and light-headedness.   Hematological: Does not bruise/bleed easily.   Psychiatric/Behavioral: Negative for agitation and confusion.        Problem List/PMH: reviewed in EMR, and made relevant updates today.  Medications: reviewed in EMR, and made relevant updates today.  Allergies: reviewed in EMR, and made relevant updates today.  I reviewed family and social history and made relevant updates today.  Social History     Tobacco Use     Smoking status: Never Smoker     Smokeless tobacco: Never Used   Substance Use Topics     Alcohol use: No     Drug use: No      Family History   Problem Relation Age of Onset     Genetic Disorder Other         Genetic,No FHx of DM       EXAM:   Vitals:    02/13/20 0836   BP:  "130/70   BP Location: Right arm   Patient Position: Sitting   Cuff Size: Adult Regular   Pulse: 64   Resp: 18   Temp: 96  F (35.6  C)   TempSrc: Tympanic   Weight: 95.4 kg (210 lb 6 oz)   Height: 1.81 m (5' 11.25\")       Current Pain Score: No Pain (0)     BP Readings from Last 3 Encounters:   02/13/20 130/70   02/08/19 134/80   10/09/18 130/80      Wt Readings from Last 3 Encounters:   02/13/20 95.4 kg (210 lb 6 oz)   02/08/19 92.1 kg (203 lb)   10/09/18 94.4 kg (208 lb 3.2 oz)      Estimated body mass index is 29.14 kg/m  as calculated from the following:    Height as of this encounter: 1.81 m (5' 11.25\").    Weight as of this encounter: 95.4 kg (210 lb 6 oz).     Physical Exam  Constitutional:       General: He is not in acute distress.     Appearance: He is well-developed. He is not diaphoretic.   HENT:      Head: Normocephalic and atraumatic.      Nose: No rhinorrhea.      Mouth/Throat:      Pharynx: No posterior oropharyngeal erythema.   Eyes:      General: No scleral icterus.     Conjunctiva/sclera: Conjunctivae normal.   Neck:      Musculoskeletal: Neck supple.      Vascular: No carotid bruit.   Cardiovascular:      Rate and Rhythm: Normal rate and regular rhythm.      Pulses: Normal pulses.   Pulmonary:      Effort: Pulmonary effort is normal.      Breath sounds: Normal breath sounds.   Abdominal:      Palpations: Abdomen is soft.      Tenderness: There is no abdominal tenderness.   Musculoskeletal:         General: No deformity.      Right lower leg: No edema.      Left lower leg: No edema.   Lymphadenopathy:      Cervical: No cervical adenopathy.   Skin:     General: Skin is warm and dry.      Findings: No rash.   Neurological:      Mental Status: He is alert and oriented to person, place, and time. Mental status is at baseline.   Psychiatric:         Mood and Affect: Mood normal.         Behavior: Behavior normal.          Procedures   INVESTIGATIONS:  - Labs reviewed in Epic     ASSESSMENT AND " PLAN:  Problem List Items Addressed This Visit        Endocrine    Mixed hyperlipidemia    Relevant Medications    atorvastatin (LIPITOR) 20 MG tablet      Other Visit Diagnoses     Annual physical exam    -  Primary    Elevated random blood glucose level        Relevant Orders    Hemoglobin A1c    CBC with platelets    Screening PSA (prostate specific antigen)        Relevant Orders    PSA Screen GH    PSA Screen GH (Completed)    Elevated serum creatinine        Encounter for hepatitis C screening test for low risk patient        Relevant Orders    Hepatitis C Screen Reflex to HCV RNA Quant and Genotype (Completed)        reviewed diet, exercise and weight control, recommended sodium restriction, cardiovascular risk and specific lipid/LDL goals reviewed, use of aspirin to prevent MI and TIA's discussed  -- Expected clinical course discussed    -- Medications and their side effects discussed    The 10-year ASCVD risk score (Buddy CAZARES Jr., et al., 2013) is: 12.9%    Values used to calculate the score:      Age: 65 years      Sex: Male      Is Non- : No      Diabetic: No      Tobacco smoker: No      Systolic Blood Pressure: 130 mmHg      Is BP treated: No      HDL Cholesterol: 34 mg/dL      Total Cholesterol: 175 mg/dL    Patient Instructions     Labs today.   Blood pressure is well controlled.     Continue annual stool blood testing with your insurance.     Make sure you are drinking enough water daily. Keep urine clear.     Recent Labs   Lab Test 02/13/20  0740 05/02/19  1001 03/17/15  1216   * 92 166*   HDL 34 38 35   TRIG 205* 96 113   ALT 41 48  --    CR 1.23 0.98  --    GFRESTIMATED 59* 78  --    GFRESTBLACK 72 >90  --    POTASSIUM 4.4 4.7  --         Results for orders placed or performed in visit on 02/13/20   Comprehensive metabolic panel     Status: Abnormal   Result Value Ref Range    Sodium 137 134 - 144 mmol/L    Potassium 4.4 3.5 - 5.1 mmol/L    Chloride 104 98 - 107 mmol/L     Carbon Dioxide 26 21 - 31 mmol/L    Anion Gap 7 3 - 14 mmol/L    Glucose 106 (H) 70 - 105 mg/dL    Urea Nitrogen 25 7 - 25 mg/dL    Creatinine 1.23 0.70 - 1.30 mg/dL    GFR Estimate 59 (L) >60 mL/min/[1.73_m2]    GFR Estimate If Black 72 >60 mL/min/[1.73_m2]    Calcium 8.9 8.6 - 10.3 mg/dL    Bilirubin Total 1.0 0.3 - 1.0 mg/dL    Albumin 4.1 3.5 - 5.7 g/dL    Protein Total 6.4 6.4 - 8.9 g/dL    Alkaline Phosphatase 60 34 - 104 U/L    ALT 41 7 - 52 U/L    AST 25 13 - 39 U/L   Lipid Profile     Status: Abnormal   Result Value Ref Range    Cholesterol 175 <200 mg/dL    Triglycerides 205 (H) <150 mg/dL    HDL Cholesterol 34 23 - 92 mg/dL    LDL Cholesterol Calculated 100 (H) <100 mg/dL    Non HDL Cholesterol 141 (H) <130 mg/dL        For varicose veins.... Consider wearing compression stockings -- put on in AM and remove in PM.     Compression Stockings:   -- Knee high   -- 20 mmHg   -- Wear when you get up until bedtime    -- Consider Fytto brand from [employer] or for more fashionable compression stockings try Gold Violin, http://[employer], [phone]      Return in approximately 1 to 2 years, or sooner as needed for follow-up with Dr. Cortes.  - Annual Follow-up / Physical    Clinic : [phone]  Appointment line: [phone]     Bo Cortes MD   Internal Medicine  [facility]     Portions of this note were dictated using speech recognition software. The note has been proofread but errors in the text may have been overlooked. Please contact me if there are any concerns regarding the accuracy of the dictation.

## 2020-02-13 ENCOUNTER — OFFICE VISIT (OUTPATIENT)
Dept: INTERNAL MEDICINE | Facility: OTHER | Age: 65
End: 2020-02-13
Attending: INTERNAL MEDICINE
Payer: COMMERCIAL

## 2020-02-13 VITALS
HEART RATE: 64 BPM | WEIGHT: 210.38 LBS | RESPIRATION RATE: 18 BRPM | DIASTOLIC BLOOD PRESSURE: 70 MMHG | HEIGHT: 71 IN | BODY MASS INDEX: 29.45 KG/M2 | SYSTOLIC BLOOD PRESSURE: 130 MMHG | TEMPERATURE: 96 F

## 2020-02-13 DIAGNOSIS — E78.2 MIXED HYPERLIPIDEMIA: ICD-10-CM

## 2020-02-13 DIAGNOSIS — R79.89 ELEVATED SERUM CREATININE: ICD-10-CM

## 2020-02-13 DIAGNOSIS — Z12.5 SCREENING PSA (PROSTATE SPECIFIC ANTIGEN): ICD-10-CM

## 2020-02-13 DIAGNOSIS — Z11.59 ENCOUNTER FOR HEPATITIS C SCREENING TEST FOR LOW RISK PATIENT: ICD-10-CM

## 2020-02-13 DIAGNOSIS — Z00.00 ANNUAL PHYSICAL EXAM: Primary | ICD-10-CM

## 2020-02-13 DIAGNOSIS — E78.1 HIGH TRIGLYCERIDES: ICD-10-CM

## 2020-02-13 DIAGNOSIS — R73.9 ELEVATED RANDOM BLOOD GLUCOSE LEVEL: ICD-10-CM

## 2020-02-13 LAB
ALBUMIN SERPL-MCNC: 4.1 G/DL (ref 3.5–5.7)
ALP SERPL-CCNC: 60 U/L (ref 34–104)
ALT SERPL W P-5'-P-CCNC: 41 U/L (ref 7–52)
ANION GAP SERPL CALCULATED.3IONS-SCNC: 7 MMOL/L (ref 3–14)
AST SERPL W P-5'-P-CCNC: 25 U/L (ref 13–39)
BILIRUB SERPL-MCNC: 1 MG/DL (ref 0.3–1)
BUN SERPL-MCNC: 25 MG/DL (ref 7–25)
CALCIUM SERPL-MCNC: 8.9 MG/DL (ref 8.6–10.3)
CHLORIDE SERPL-SCNC: 104 MMOL/L (ref 98–107)
CHOLEST SERPL-MCNC: 175 MG/DL
CO2 SERPL-SCNC: 26 MMOL/L (ref 21–31)
CREAT SERPL-MCNC: 1.23 MG/DL (ref 0.7–1.3)
GFR SERPL CREATININE-BSD FRML MDRD: 59 ML/MIN/{1.73_M2}
GLUCOSE SERPL-MCNC: 106 MG/DL (ref 70–105)
HDLC SERPL-MCNC: 34 MG/DL (ref 23–92)
LDLC SERPL CALC-MCNC: 100 MG/DL
NONHDLC SERPL-MCNC: 141 MG/DL
POTASSIUM SERPL-SCNC: 4.4 MMOL/L (ref 3.5–5.1)
PROT SERPL-MCNC: 6.4 G/DL (ref 6.4–8.9)
SODIUM SERPL-SCNC: 137 MMOL/L (ref 134–144)
TRIGL SERPL-MCNC: 205 MG/DL

## 2020-02-13 PROCEDURE — 80053 COMPREHEN METABOLIC PANEL: CPT | Mod: ZL | Performed by: INTERNAL MEDICINE

## 2020-02-13 PROCEDURE — 80061 LIPID PANEL: CPT | Mod: ZL | Performed by: INTERNAL MEDICINE

## 2020-02-13 PROCEDURE — 99396 PREV VISIT EST AGE 40-64: CPT | Performed by: INTERNAL MEDICINE

## 2020-02-13 PROCEDURE — 36415 COLL VENOUS BLD VENIPUNCTURE: CPT | Mod: ZL | Performed by: INTERNAL MEDICINE

## 2020-02-13 RX ORDER — ATORVASTATIN CALCIUM 20 MG/1
20 TABLET, FILM COATED ORAL DAILY
Qty: 90 TABLET | Refills: 3 | Status: SHIPPED | OUTPATIENT
Start: 2020-02-13 | End: 2021-01-08

## 2020-02-13 ASSESSMENT — ENCOUNTER SYMPTOMS
BRUISES/BLEEDS EASILY: 0
DYSURIA: 0
HEMATURIA: 0
NAUSEA: 0
AGITATION: 0
ABDOMINAL PAIN: 0
CHILLS: 0
LIGHT-HEADEDNESS: 0
DIARRHEA: 0
DIZZINESS: 0
ARTHRALGIAS: 0
SHORTNESS OF BREATH: 0
WHEEZING: 0
COUGH: 0
VOMITING: 0
FEVER: 0
PALPITATIONS: 0
MYALGIAS: 0
WOUND: 0
CONFUSION: 0

## 2020-02-13 ASSESSMENT — PATIENT HEALTH QUESTIONNAIRE - PHQ9
SUM OF ALL RESPONSES TO PHQ QUESTIONS 1-9: 0
5. POOR APPETITE OR OVEREATING: NOT AT ALL

## 2020-02-13 ASSESSMENT — MIFFLIN-ST. JEOR: SCORE: 1775.34

## 2020-02-13 ASSESSMENT — ANXIETY QUESTIONNAIRES
7. FEELING AFRAID AS IF SOMETHING AWFUL MIGHT HAPPEN: NOT AT ALL
1. FEELING NERVOUS, ANXIOUS, OR ON EDGE: NOT AT ALL
IF YOU CHECKED OFF ANY PROBLEMS ON THIS QUESTIONNAIRE, HOW DIFFICULT HAVE THESE PROBLEMS MADE IT FOR YOU TO DO YOUR WORK, TAKE CARE OF THINGS AT HOME, OR GET ALONG WITH OTHER PEOPLE: NOT DIFFICULT AT ALL
2. NOT BEING ABLE TO STOP OR CONTROL WORRYING: NOT AT ALL
5. BEING SO RESTLESS THAT IT IS HARD TO SIT STILL: NOT AT ALL
GAD7 TOTAL SCORE: 0
6. BECOMING EASILY ANNOYED OR IRRITABLE: NOT AT ALL
3. WORRYING TOO MUCH ABOUT DIFFERENT THINGS: NOT AT ALL

## 2020-02-13 ASSESSMENT — PAIN SCALES - GENERAL: PAINLEVEL: NO PAIN (0)

## 2020-02-13 NOTE — PATIENT INSTRUCTIONS
Labs today.   Blood pressure is well controlled.     Continue annual stool blood testing with your insurance.     Make sure you are drinking enough water daily. Keep urine clear.     Recent Labs   Lab Test 02/13/20  0740 05/02/19  1001 03/17/15  1216   * 92 166*   HDL 34 38 35   TRIG 205* 96 113   ALT 41 48  --    CR 1.23 0.98  --    GFRESTIMATED 59* 78  --    GFRESTBLACK 72 >90  --    POTASSIUM 4.4 4.7  --         Results for orders placed or performed in visit on 02/13/20   Comprehensive metabolic panel     Status: Abnormal   Result Value Ref Range    Sodium 137 134 - 144 mmol/L    Potassium 4.4 3.5 - 5.1 mmol/L    Chloride 104 98 - 107 mmol/L    Carbon Dioxide 26 21 - 31 mmol/L    Anion Gap 7 3 - 14 mmol/L    Glucose 106 (H) 70 - 105 mg/dL    Urea Nitrogen 25 7 - 25 mg/dL    Creatinine 1.23 0.70 - 1.30 mg/dL    GFR Estimate 59 (L) >60 mL/min/[1.73_m2]    GFR Estimate If Black 72 >60 mL/min/[1.73_m2]    Calcium 8.9 8.6 - 10.3 mg/dL    Bilirubin Total 1.0 0.3 - 1.0 mg/dL    Albumin 4.1 3.5 - 5.7 g/dL    Protein Total 6.4 6.4 - 8.9 g/dL    Alkaline Phosphatase 60 34 - 104 U/L    ALT 41 7 - 52 U/L    AST 25 13 - 39 U/L   Lipid Profile     Status: Abnormal   Result Value Ref Range    Cholesterol 175 <200 mg/dL    Triglycerides 205 (H) <150 mg/dL    HDL Cholesterol 34 23 - 92 mg/dL    LDL Cholesterol Calculated 100 (H) <100 mg/dL    Non HDL Cholesterol 141 (H) <130 mg/dL        For varicose veins.... Consider wearing compression stockings -- put on in AM and remove in PM.     Compression Stockings:   -- Knee high   -- 20 mmHg   -- Wear when you get up until bedtime    -- Consider Fytto brand from [employer] or for more fashionable compression stockings try [employer]in, [employer], [phone]      Return in approximately 1 to 2 years, or sooner as needed for follow-up with Dr. Cortes.  - Annual Follow-up / Physical    Clinic : [phone]  Appointment line: [phone]

## 2020-02-13 NOTE — NURSING NOTE
"Patient presents to the clinic for medication management.    Chief Complaint   Patient presents with     Recheck Medication       Initial /70 (BP Location: Right arm, Patient Position: Sitting, Cuff Size: Adult Regular)   Pulse 64   Temp 96  F (35.6  C) (Tympanic)   Resp 18   Ht 1.81 m (5' 11.25\")   Wt 95.4 kg (210 lb 6 oz)   BMI 29.14 kg/m   Estimated body mass index is 29.14 kg/m  as calculated from the following:    Height as of this encounter: 1.81 m (5' 11.25\").    Weight as of this encounter: 95.4 kg (210 lb 6 oz).  Medication Reconciliation: complete    Lou Pacheco LPN    "

## 2020-02-14 ASSESSMENT — ANXIETY QUESTIONNAIRES: GAD7 TOTAL SCORE: 0

## 2020-12-29 DIAGNOSIS — E78.2 MIXED HYPERLIPIDEMIA: ICD-10-CM

## 2020-12-29 RX ORDER — ATORVASTATIN CALCIUM 20 MG/1
20 TABLET, FILM COATED ORAL DAILY
Qty: 90 TABLET | Refills: 3 | OUTPATIENT
Start: 2020-12-29

## 2020-12-29 NOTE — TELEPHONE ENCOUNTER
[id number] sent Rx request for the following:   atorvastatin (LIPITOR) 20 MG tablet  Sig:TAKE 1 TABLET (20 MG) BY MOUTH DAILY    Last Prescription Date:   02/13/2020  Last Fill Qty/Refills:         90, R-3      Redundant refill request refused: Too soon:    Unable to complete prescription refill per RN Medication Refill Policy.................... Tamra Desai RN ....................  12/29/2020   10:31 AM

## 2021-01-08 DIAGNOSIS — E78.2 MIXED HYPERLIPIDEMIA: ICD-10-CM

## 2021-01-08 RX ORDER — ATORVASTATIN CALCIUM 20 MG/1
20 TABLET, FILM COATED ORAL DAILY
Qty: 90 TABLET | Refills: 0 | Status: SHIPPED | OUTPATIENT
Start: 2021-01-08 | End: 2021-01-26

## 2021-01-08 NOTE — TELEPHONE ENCOUNTER
Prescription approved per [facility] Refill Protocol.  LOV: and labs: 2/13/2020    Brittany Lugo RN on 1/8/2021 at 12:27 PM

## 2021-01-21 ENCOUNTER — TELEPHONE (OUTPATIENT)
Dept: INTERNAL MEDICINE | Facility: OTHER | Age: 66
End: 2021-01-21

## 2021-01-21 DIAGNOSIS — R73.9 ELEVATED RANDOM BLOOD GLUCOSE LEVEL: ICD-10-CM

## 2021-01-21 DIAGNOSIS — E78.2 MIXED HYPERLIPIDEMIA: ICD-10-CM

## 2021-01-21 DIAGNOSIS — I10 BENIGN ESSENTIAL HYPERTENSION: Primary | ICD-10-CM

## 2021-01-21 DIAGNOSIS — Z12.5 SCREENING PSA (PROSTATE SPECIFIC ANTIGEN): ICD-10-CM

## 2021-01-21 NOTE — TELEPHONE ENCOUNTER
Pt would like to get labs ordered for appt to renew his medication.  Please call      Manuel Escobar on 1/21/2021 at 11:10 AM

## 2021-01-21 NOTE — TELEPHONE ENCOUNTER
Spoke with patient and a lab only appointment was made as well as a medication review visit with Dr. Cortes.  Jaja Chatterjee on 1/21/2021 at 3:03 PM

## 2021-01-25 DIAGNOSIS — I10 BENIGN ESSENTIAL HYPERTENSION: ICD-10-CM

## 2021-01-25 DIAGNOSIS — E78.2 MIXED HYPERLIPIDEMIA: ICD-10-CM

## 2021-01-25 DIAGNOSIS — R73.9 ELEVATED RANDOM BLOOD GLUCOSE LEVEL: ICD-10-CM

## 2021-01-25 DIAGNOSIS — Z12.5 SCREENING PSA (PROSTATE SPECIFIC ANTIGEN): ICD-10-CM

## 2021-01-25 LAB
ALBUMIN SERPL-MCNC: 4.2 G/DL (ref 3.5–5.7)
ALBUMIN UR-MCNC: 10 MG/DL
ALP SERPL-CCNC: 57 U/L (ref 34–104)
ALT SERPL W P-5'-P-CCNC: 57 U/L (ref 7–52)
ANION GAP SERPL CALCULATED.3IONS-SCNC: 4 MMOL/L (ref 3–14)
APPEARANCE UR: CLEAR
AST SERPL W P-5'-P-CCNC: 29 U/L (ref 13–39)
BILIRUB SERPL-MCNC: 1.3 MG/DL (ref 0.3–1)
BILIRUB UR QL STRIP: NEGATIVE
BUN SERPL-MCNC: 24 MG/DL (ref 7–25)
CALCIUM SERPL-MCNC: 9.2 MG/DL (ref 8.6–10.3)
CHLORIDE SERPL-SCNC: 102 MMOL/L (ref 98–107)
CHOLEST SERPL-MCNC: 133 MG/DL
CO2 SERPL-SCNC: 32 MMOL/L (ref 21–31)
COLOR UR AUTO: ABNORMAL
CREAT SERPL-MCNC: 1.21 MG/DL (ref 0.7–1.3)
CREAT UR-MCNC: 175 MG/DL
ERYTHROCYTE [DISTWIDTH] IN BLOOD BY AUTOMATED COUNT: 12.2 % (ref 10–15)
GFR SERPL CREATININE-BSD FRML MDRD: 60 ML/MIN/{1.73_M2}
GLUCOSE SERPL-MCNC: 108 MG/DL (ref 70–105)
GLUCOSE UR STRIP-MCNC: NEGATIVE MG/DL
HBA1C MFR BLD: 5.3 % (ref 4–6)
HCT VFR BLD AUTO: 50.8 % (ref 40–53)
HDLC SERPL-MCNC: 37 MG/DL (ref 23–92)
HGB BLD-MCNC: 17.4 G/DL (ref 13.3–17.7)
HGB UR QL STRIP: NEGATIVE
KETONES UR STRIP-MCNC: NEGATIVE MG/DL
LDLC SERPL CALC-MCNC: 60 MG/DL
LEUKOCYTE ESTERASE UR QL STRIP: NEGATIVE
MCH RBC QN AUTO: 28.2 PG (ref 26.5–33)
MCHC RBC AUTO-ENTMCNC: 34.3 G/DL (ref 31.5–36.5)
MCV RBC AUTO: 82 FL (ref 78–100)
MICROALBUMIN UR-MCNC: 137 MG/L
MICROALBUMIN/CREAT UR: 78.29 MG/G CR (ref 0–17)
MUCOUS THREADS #/AREA URNS LPF: PRESENT /LPF
NITRATE UR QL: NEGATIVE
NONHDLC SERPL-MCNC: 96 MG/DL
PH UR STRIP: 5 PH (ref 5–7)
PLATELET # BLD AUTO: 194 10E9/L (ref 150–450)
POTASSIUM SERPL-SCNC: 4.4 MMOL/L (ref 3.5–5.1)
PROT SERPL-MCNC: 7.2 G/DL (ref 6.4–8.9)
PSA SERPL-ACNC: 1.9 NG/ML
RBC # BLD AUTO: 6.18 10E12/L (ref 4.4–5.9)
RBC #/AREA URNS AUTO: 1 /HPF (ref 0–2)
SODIUM SERPL-SCNC: 138 MMOL/L (ref 134–144)
SOURCE: ABNORMAL
SP GR UR STRIP: 1.02 (ref 1–1.03)
TRIGL SERPL-MCNC: 178 MG/DL
TSH SERPL DL<=0.05 MIU/L-ACNC: 5.33 IU/ML (ref 0.34–5.6)
UROBILINOGEN UR STRIP-MCNC: NORMAL MG/DL (ref 0–2)
WBC # BLD AUTO: 7.5 10E9/L (ref 4–11)
WBC #/AREA URNS AUTO: 1 /HPF (ref 0–5)

## 2021-01-25 PROCEDURE — 36415 COLL VENOUS BLD VENIPUNCTURE: CPT | Mod: ZL | Performed by: INTERNAL MEDICINE

## 2021-01-25 PROCEDURE — 85027 COMPLETE CBC AUTOMATED: CPT | Mod: ZL | Performed by: INTERNAL MEDICINE

## 2021-01-25 PROCEDURE — 80061 LIPID PANEL: CPT | Mod: ZL | Performed by: INTERNAL MEDICINE

## 2021-01-25 PROCEDURE — 84443 ASSAY THYROID STIM HORMONE: CPT | Mod: ZL | Performed by: INTERNAL MEDICINE

## 2021-01-25 PROCEDURE — G0103 PSA SCREENING: HCPCS | Mod: ZL | Performed by: INTERNAL MEDICINE

## 2021-01-25 PROCEDURE — 82043 UR ALBUMIN QUANTITATIVE: CPT | Mod: ZL | Performed by: INTERNAL MEDICINE

## 2021-01-25 PROCEDURE — 81001 URINALYSIS AUTO W/SCOPE: CPT | Mod: ZL | Performed by: INTERNAL MEDICINE

## 2021-01-25 PROCEDURE — 80053 COMPREHEN METABOLIC PANEL: CPT | Mod: ZL | Performed by: INTERNAL MEDICINE

## 2021-01-25 PROCEDURE — 83036 HEMOGLOBIN GLYCOSYLATED A1C: CPT | Mod: ZL | Performed by: INTERNAL MEDICINE

## 2021-01-26 ENCOUNTER — OFFICE VISIT (OUTPATIENT)
Dept: INTERNAL MEDICINE | Facility: OTHER | Age: 66
End: 2021-01-26
Attending: INTERNAL MEDICINE
Payer: COMMERCIAL

## 2021-01-26 ENCOUNTER — HOSPITAL ENCOUNTER (OUTPATIENT)
Dept: GENERAL RADIOLOGY | Facility: OTHER | Age: 66
End: 2021-01-26
Attending: INTERNAL MEDICINE
Payer: COMMERCIAL

## 2021-01-26 VITALS
BODY MASS INDEX: 28.5 KG/M2 | SYSTOLIC BLOOD PRESSURE: 136 MMHG | WEIGHT: 210.4 LBS | OXYGEN SATURATION: 94 % | DIASTOLIC BLOOD PRESSURE: 88 MMHG | RESPIRATION RATE: 16 BRPM | HEART RATE: 83 BPM | TEMPERATURE: 95.8 F | HEIGHT: 72 IN

## 2021-01-26 DIAGNOSIS — G89.29 CHRONIC LEFT SHOULDER PAIN: Primary | ICD-10-CM

## 2021-01-26 DIAGNOSIS — M25.512 CHRONIC LEFT SHOULDER PAIN: Primary | ICD-10-CM

## 2021-01-26 DIAGNOSIS — M25.612 DECREASED RANGE OF MOTION OF LEFT SHOULDER: ICD-10-CM

## 2021-01-26 DIAGNOSIS — E78.2 MIXED HYPERLIPIDEMIA: ICD-10-CM

## 2021-01-26 DIAGNOSIS — R03.0 ELEVATED BLOOD PRESSURE READING WITHOUT DIAGNOSIS OF HYPERTENSION: ICD-10-CM

## 2021-01-26 PROCEDURE — 73030 X-RAY EXAM OF SHOULDER: CPT | Mod: LT

## 2021-01-26 PROCEDURE — 99214 OFFICE O/P EST MOD 30 MIN: CPT | Performed by: INTERNAL MEDICINE

## 2021-01-26 RX ORDER — ASPIRIN 325 MG
325 TABLET ORAL DAILY
COMMUNITY
End: 2024-02-02

## 2021-01-26 RX ORDER — ATORVASTATIN CALCIUM 20 MG/1
20 TABLET, FILM COATED ORAL DAILY
Qty: 90 TABLET | Refills: 3 | Status: SHIPPED | OUTPATIENT
Start: 2021-01-26 | End: 2022-01-26

## 2021-01-26 ASSESSMENT — ENCOUNTER SYMPTOMS
VOMITING: 0
COUGH: 0
AGITATION: 0
LIGHT-HEADEDNESS: 0
PALPITATIONS: 0
NAUSEA: 0
CHILLS: 0
ABDOMINAL PAIN: 0
DIARRHEA: 0
WHEEZING: 0
DIZZINESS: 0
HEMATURIA: 0
MYALGIAS: 0
ARTHRALGIAS: 1
SHORTNESS OF BREATH: 0
CONFUSION: 0
FEVER: 0
DYSURIA: 0
WOUND: 0
BRUISES/BLEEDS EASILY: 0

## 2021-01-26 ASSESSMENT — PAIN SCALES - GENERAL: PAINLEVEL: MODERATE PAIN (5)

## 2021-01-26 ASSESSMENT — MIFFLIN-ST. JEOR: SCORE: 1779.99

## 2021-01-26 NOTE — NURSING NOTE
Chief Complaint   Patient presents with     Medication Therapy Management   Patient had a hernia by belly button. Patient has right shoulder pain.    Medication Reconciliation complete.   Elizabeth Frazier LPN  ..................1/26/2021   11:23 AM

## 2021-01-26 NOTE — PATIENT INSTRUCTIONS
Orders Only on 01/25/2021   Component Date Value Ref Range Status     PSA Screen 01/25/2021 1.895  <3.100 ng/mL Final    Comment: The DXI Access PSAS WHO assay is a two site immunoenzymatic assay. Assay   values obtained with different assay methods cannot be used interchangeably   due to differences in assay methods and reagent specificity.       TSH Reflex 01/25/2021 5.33  0.34 - 5.60 IU/mL Final     Sodium 01/25/2021 138  134 - 144 mmol/L Final     Potassium 01/25/2021 4.4  3.5 - 5.1 mmol/L Final     Chloride 01/25/2021 102  98 - 107 mmol/L Final     Carbon Dioxide 01/25/2021 32* 21 - 31 mmol/L Final     Anion Gap 01/25/2021 4  3 - 14 mmol/L Final     Glucose 01/25/2021 108* 70 - 105 mg/dL Final     Urea Nitrogen 01/25/2021 24  7 - 25 mg/dL Final     Creatinine 01/25/2021 1.21  0.70 - 1.30 mg/dL Final     GFR Estimate 01/25/2021 60* >60 mL/min/[1.73_m2] Final     GFR Estimate If Black 01/25/2021 73  >60 mL/min/[1.73_m2] Final     Calcium 01/25/2021 9.2  8.6 - 10.3 mg/dL Final     Bilirubin Total 01/25/2021 1.3* 0.3 - 1.0 mg/dL Final     Albumin 01/25/2021 4.2  3.5 - 5.7 g/dL Final     Protein Total 01/25/2021 7.2  6.4 - 8.9 g/dL Final     Alkaline Phosphatase 01/25/2021 57  34 - 104 U/L Final     ALT 01/25/2021 57* 7 - 52 U/L Final     AST 01/25/2021 29  13 - 39 U/L Final     WBC 01/25/2021 7.5  4.0 - 11.0 10e9/L Final     RBC Count 01/25/2021 6.18* 4.4 - 5.9 10e12/L Final     Hemoglobin 01/25/2021 17.4  13.3 - 17.7 g/dL Final     Hematocrit 01/25/2021 50.8  40.0 - 53.0 % Final     MCV 01/25/2021 82  78 - 100 fl Final     MCH 01/25/2021 28.2  26.5 - 33.0 pg Final     MCHC 01/25/2021 34.3  31.5 - 36.5 g/dL Final     RDW 01/25/2021 12.2  10.0 - 15.0 % Final     Platelet Count 01/25/2021 194  150 - 450 10e9/L Final     Cholesterol 01/25/2021 133  <200 mg/dL Final     Triglycerides 01/25/2021 178* <150 mg/dL Final    Comment: Borderline high:  150-199 mg/dl  High:             200-499 mg/dl  Very high:       >499  mg/dl       HDL Cholesterol 01/25/2021 37  23 - 92 mg/dL Final     LDL Cholesterol Calculated 01/25/2021 60  <100 mg/dL Final    Desirable:       <100 mg/dl     Non HDL Cholesterol 01/25/2021 96  <130 mg/dL Final     Hemoglobin A1C 01/25/2021 5.3  4.0 - 6.0 % Final     Creatinine Urine 01/25/2021 175  mg/dL Final     Albumin Urine mg/L 01/25/2021 137  mg/L Final     Albumin Urine mg/g Cr 01/25/2021 78.29* 0 - 17 mg/g Cr Final     Color Urine 01/25/2021 Light Yellow   Final     Appearance Urine 01/25/2021 Clear   Final     Glucose Urine 01/25/2021 Negative  NEG^Negative mg/dL Final     Bilirubin Urine 01/25/2021 Negative  NEG^Negative Final     Ketones Urine 01/25/2021 Negative  NEG^Negative mg/dL Final     Specific Gravity Urine 01/25/2021 1.024  1.003 - 1.035 Final     Blood Urine 01/25/2021 Negative  NEG^Negative Final     pH Urine 01/25/2021 5.0  5.0 - 7.0 pH Final     Protein Albumin Urine 01/25/2021 10* NEG^Negative mg/dL Final     Urobilinogen mg/dL 01/25/2021 Normal  0.0 - 2.0 mg/dL Final     Nitrite Urine 01/25/2021 Negative  NEG^Negative Final     Leukocyte Esterase Urine 01/25/2021 Negative  NEG^Negative Final     Source 01/25/2021 Midstream Urine   Final     RBC Urine 01/25/2021 1  0 - 2 /HPF Final     WBC Urine 01/25/2021 1  0 - 5 /HPF Final     Mucous Urine 01/25/2021 Present* NEG^Negative /LPF Final        Immunization History   Administered Date(s) Administered     Influenza (IIV3) PF 12/22/2009        -- Get the 2 shot COVID-19 Vaccination series (1st shot when available and 2nd shot 17 to 21 days later) -- (No vaccines for 2 weeks prior to Covid Vaccine)    -- Consider Annual Flu / Influenza vaccination - Every Fall (Starting about October 1st)    -- Get your tetanus shot updated - from one of the local pharmacies, at your convenience.   -- Get the new shingles shot (2 in series) (Shingrix) - from one of the local pharmacies, at your convenience. -- Check cost/coverage.     Pneumococcal Pneumonia  vaccines (PCV 23) -- after age 65 x1 dose.     Left shoulder xray today.    Call for orthopedic appointment if symptoms worsen.  -- [facility]   Phone: [phone]       Return in approximately 1 year, or sooner as needed for follow-up with Dr. Cortes.    - Welcome to medicare physical    Clinic : [phone]  Appointment line: [phone]

## 2021-01-26 NOTE — PROGRESS NOTES
Mr. Harman is a 66 year old male who presents to the clinic for evaluation of the following problems:      ICD-10-CM    1. Chronic left shoulder pain  M25.512     G89.29    2. Mixed hyperlipidemia  E78.2 atorvastatin (LIPITOR) 20 MG tablet     XR Shoulder Left G/E 3 Views   3. Decreased range of motion of left shoulder  M25.612    4. Elevated blood pressure reading without diagnosis of hypertension  R03.0      HPI  Patient presents for follow-up of a number of issues.    Chronic left shoulder pain, has some range of motion limitation.  States that it is starting to hurt like his right shoulder.  Previously.  He fell a while back and ended up injuring his shoulder.  He had to use his left arm to move his right arm in certain positions because he suffered a rotator cuff injury.  He is wondering if he might be developing some arthritis if they might consider doing surgery on a partially torn rotator cuff.  Certain movements and activities do cause some pain and range of motion limitation.  Increase activity levels worsen the shoulder pain.  If he has rested for a while some of these activities are tolerated but on a much limited basis.  We discussed evaluation and treatment options.  Consider physical therapy.  He did have a number of home exercises to help with his shoulder range of motion, and strengthening in the past for his right shoulder.  Recommend that he start some of these of the left shoulder now as well.  Shoulder x-rays today did not show any significant spurring or arthritis, see results below.  Advised that if a formal physical therapy referral is requested, we can send referral.    Mixed hyperlipidemia, seems to be doing quite well with Lipitor, currently 20 mg daily.  Tolerating without side effects.  Labs were checked prior to appointment and have come down significantly compared to his previous LDL levels.  Has had high triglycerides and needs do remain slightly elevated.  Currently taking omega-3  "2000 mg twice daily.    Elevated blood pressure without hypertension, states that all of his family has hypertension.  Initial blood pressure was quite elevated measuring 158/90, recheck was 142/84, I rechecked myself and it did come back 136/88.  We will continue to monitor.  Recommend checking at home intermittently.  If blood pressures do remain elevated that we should consider starting antihypertensives.    He has a full physical through his insurance/work in the near future including stress testing and will let us know if his blood pressures are elevated at that time.    Functional Capacity: > 4 METS.   Review of Systems   Constitutional: Negative for chills and fever.   HENT: Negative for congestion and hearing loss.    Eyes: Negative for visual disturbance.   Respiratory: Negative for cough, shortness of breath and wheezing.    Cardiovascular: Negative for chest pain and palpitations.   Gastrointestinal: Negative for abdominal pain, diarrhea, nausea and vomiting.   Endocrine: Negative for cold intolerance and heat intolerance.   Genitourinary: Negative for dysuria and hematuria.   Musculoskeletal: Positive for arthralgias (left shoulder pain with activity). Negative for myalgias.   Skin: Negative for rash and wound.   Allergic/Immunologic: Negative for immunocompromised state.   Neurological: Negative for dizziness and light-headedness.   Hematological: Does not bruise/bleed easily.   Psychiatric/Behavioral: Negative for agitation and confusion.      Reviewed and updated as needed this visit by Provider   Tobacco  Allergies  Meds  Problems  Med Hx  Surg Hx  Fam Hx          EXAM:   Vitals:    01/26/21 1127 01/26/21 1131   BP: (!) 158/90 136/88   BP Location: Right arm Right arm   Patient Position: Sitting Sitting   Cuff Size: Adult Regular Adult Regular   Pulse: 83    Resp: 16    Temp: 95.8  F (35.4  C)    TempSrc: Tympanic    SpO2: 94%    Weight: 95.4 kg (210 lb 6.4 oz)    Height: 1.825 m (5' 11.85\")  " "      Current Pain Score: Moderate Pain (5)     BP Readings from Last 3 Encounters:   01/26/21 136/88   02/13/20 130/70   02/08/19 134/80      Wt Readings from Last 3 Encounters:   01/26/21 95.4 kg (210 lb 6.4 oz)   02/13/20 95.4 kg (210 lb 6 oz)   02/08/19 92.1 kg (203 lb)      Estimated body mass index is 28.65 kg/m  as calculated from the following:    Height as of this encounter: 1.825 m (5' 11.85\").    Weight as of this encounter: 95.4 kg (210 lb 6.4 oz).     Physical Exam  Constitutional:       General: He is not in acute distress.     Appearance: He is well-developed. He is not diaphoretic.   HENT:      Head: Normocephalic and atraumatic.   Eyes:      General: No scleral icterus.     Conjunctiva/sclera: Conjunctivae normal.   Neck:      Musculoskeletal: Neck supple.      Vascular: No carotid bruit.   Cardiovascular:      Rate and Rhythm: Normal rate and regular rhythm.      Pulses: Normal pulses.   Pulmonary:      Effort: Pulmonary effort is normal.      Breath sounds: Normal breath sounds.   Abdominal:      Palpations: Abdomen is soft.      Tenderness: There is no abdominal tenderness.   Musculoskeletal:         General: No deformity.      Right lower leg: No edema.      Left lower leg: No edema.      Comments: Problems with abduction/flexion of left shoulder   Lymphadenopathy:      Cervical: No cervical adenopathy.   Skin:     General: Skin is warm and dry.      Findings: No rash.   Neurological:      Mental Status: He is alert and oriented to person, place, and time. Mental status is at baseline.   Psychiatric:         Mood and Affect: Mood normal.         Behavior: Behavior normal.          Procedures   INVESTIGATIONS:  - Labs reviewed in Epic     XR Shoulder Left G/E 3 Views  Narrative: PROCEDURE:  XR SHOULDER LT G/E 3 VW    HISTORY: Left shoulder pain    COMPARISON:  None.    TECHNIQUE:  3 views of the left shoulder were obtained.    FINDINGS:  No fracture or dislocation is identified. The joint " spaces  are preserved.    Impression: IMPRESSION: Normal left shoulder      TUNDE SMITH MD     Recent Labs   Lab Test 01/25/21  0843 02/13/20  0740 05/02/19  1001   A1C 5.3  --   --    LDL 60 100* 92   HDL 37 34 38   TRIG 178* 205* 96   ALT 57* 41 48   CR 1.21 1.23 0.98   GFRESTIMATED 60* 59* 78   GFRESTBLACK 73 72 >90   POTASSIUM 4.4 4.4 4.7      ASSESSMENT AND PLAN:  Problem List Items Addressed This Visit        Nervous and Auditory    Chronic left shoulder pain - Primary       Endocrine    Mixed hyperlipidemia    Relevant Medications    atorvastatin (LIPITOR) 20 MG tablet    Other Relevant Orders    XR Shoulder Left G/E 3 Views (Completed)       Musculoskeletal and Integumentary    Decreased range of motion of left shoulder       Other    Elevated blood pressure reading without diagnosis of hypertension        reviewed diet, exercise and weight control, recommended sodium restriction  -- Expected clinical course discussed    -- Medications and their side effects discussed    The 10-year ASCVD risk score (Buddy ACZARES Jr., et al., 2013) is: 11.6%    Values used to calculate the score:      Age: 66 years      Sex: Male      Is Non- : No      Diabetic: No      Tobacco smoker: No      Systolic Blood Pressure: 136 mmHg      Is BP treated: No      HDL Cholesterol: 37 mg/dL      Total Cholesterol: 133 mg/dL    Patient Instructions     Orders Only on 01/25/2021   Component Date Value Ref Range Status     PSA Screen 01/25/2021 1.895  <3.100 ng/mL Final    Comment: The DXI Access PSAS WHO assay is a two site immunoenzymatic assay. Assay   values obtained with different assay methods cannot be used interchangeably   due to differences in assay methods and reagent specificity.       TSH Reflex 01/25/2021 5.33  0.34 - 5.60 IU/mL Final     Sodium 01/25/2021 138  134 - 144 mmol/L Final     Potassium 01/25/2021 4.4  3.5 - 5.1 mmol/L Final     Chloride 01/25/2021 102  98 - 107 mmol/L Final     Carbon  Dioxide 01/25/2021 32* 21 - 31 mmol/L Final     Anion Gap 01/25/2021 4  3 - 14 mmol/L Final     Glucose 01/25/2021 108* 70 - 105 mg/dL Final     Urea Nitrogen 01/25/2021 24  7 - 25 mg/dL Final     Creatinine 01/25/2021 1.21  0.70 - 1.30 mg/dL Final     GFR Estimate 01/25/2021 60* >60 mL/min/[1.73_m2] Final     GFR Estimate If Black 01/25/2021 73  >60 mL/min/[1.73_m2] Final     Calcium 01/25/2021 9.2  8.6 - 10.3 mg/dL Final     Bilirubin Total 01/25/2021 1.3* 0.3 - 1.0 mg/dL Final     Albumin 01/25/2021 4.2  3.5 - 5.7 g/dL Final     Protein Total 01/25/2021 7.2  6.4 - 8.9 g/dL Final     Alkaline Phosphatase 01/25/2021 57  34 - 104 U/L Final     ALT 01/25/2021 57* 7 - 52 U/L Final     AST 01/25/2021 29  13 - 39 U/L Final     WBC 01/25/2021 7.5  4.0 - 11.0 10e9/L Final     RBC Count 01/25/2021 6.18* 4.4 - 5.9 10e12/L Final     Hemoglobin 01/25/2021 17.4  13.3 - 17.7 g/dL Final     Hematocrit 01/25/2021 50.8  40.0 - 53.0 % Final     MCV 01/25/2021 82  78 - 100 fl Final     MCH 01/25/2021 28.2  26.5 - 33.0 pg Final     MCHC 01/25/2021 34.3  31.5 - 36.5 g/dL Final     RDW 01/25/2021 12.2  10.0 - 15.0 % Final     Platelet Count 01/25/2021 194  150 - 450 10e9/L Final     Cholesterol 01/25/2021 133  <200 mg/dL Final     Triglycerides 01/25/2021 178* <150 mg/dL Final    Comment: Borderline high:  150-199 mg/dl  High:             200-499 mg/dl  Very high:       >499 mg/dl       HDL Cholesterol 01/25/2021 37  23 - 92 mg/dL Final     LDL Cholesterol Calculated 01/25/2021 60  <100 mg/dL Final    Desirable:       <100 mg/dl     Non HDL Cholesterol 01/25/2021 96  <130 mg/dL Final     Hemoglobin A1C 01/25/2021 5.3  4.0 - 6.0 % Final     Creatinine Urine 01/25/2021 175  mg/dL Final     Albumin Urine mg/L 01/25/2021 137  mg/L Final     Albumin Urine mg/g Cr 01/25/2021 78.29* 0 - 17 mg/g Cr Final     Color Urine 01/25/2021 Light Yellow   Final     Appearance Urine 01/25/2021 Clear   Final     Glucose Urine 01/25/2021 Negative   NEG^Negative mg/dL Final     Bilirubin Urine 01/25/2021 Negative  NEG^Negative Final     Ketones Urine 01/25/2021 Negative  NEG^Negative mg/dL Final     Specific Gravity Urine 01/25/2021 1.024  1.003 - 1.035 Final     Blood Urine 01/25/2021 Negative  NEG^Negative Final     pH Urine 01/25/2021 5.0  5.0 - 7.0 pH Final     Protein Albumin Urine 01/25/2021 10* NEG^Negative mg/dL Final     Urobilinogen mg/dL 01/25/2021 Normal  0.0 - 2.0 mg/dL Final     Nitrite Urine 01/25/2021 Negative  NEG^Negative Final     Leukocyte Esterase Urine 01/25/2021 Negative  NEG^Negative Final     Source 01/25/2021 Midstream Urine   Final     RBC Urine 01/25/2021 1  0 - 2 /HPF Final     WBC Urine 01/25/2021 1  0 - 5 /HPF Final     Mucous Urine 01/25/2021 Present* NEG^Negative /LPF Final        Immunization History   Administered Date(s) Administered     Influenza (IIV3) PF 12/22/2009        -- Get the 2 shot COVID-19 Vaccination series (1st shot when available and 2nd shot 17 to 21 days later) -- (No vaccines for 2 weeks prior to Covid Vaccine)    -- Consider Annual Flu / Influenza vaccination - Every Fall (Starting about October 1st)    -- Get your tetanus shot updated - from one of the local pharmacies, at your convenience.   -- Get the new shingles shot (2 in series) (Shingrix) - from one of the local pharmacies, at your convenience. -- Check cost/coverage.     Pneumococcal Pneumonia vaccines (PCV 23) -- after age 65 x1 dose.     Left shoulder xray today.    Call for orthopedic appointment if symptoms worsen.  -- [facility]   Phone: [phone]       Return in approximately 1 year, or sooner as needed for follow-up with Dr. Cortes.    - Welcome to medicare physical    Clinic : [phone]  Appointment line: [phone]     Bo Cortes MD   Internal Medicine  [facility]     Portions of this note were dictated using speech recognition software. The note has been proofread but errors in the  text may have been overlooked. Please contact me if there are any concerns regarding the accuracy of the dictation.

## 2021-03-02 ENCOUNTER — TELEPHONE (OUTPATIENT)
Dept: FAMILY MEDICINE | Facility: OTHER | Age: 66
End: 2021-03-02

## 2021-03-16 ENCOUNTER — RESULTS ONLY (OUTPATIENT)
Dept: LAB | Age: 66
End: 2021-03-16

## 2021-03-16 ENCOUNTER — APPOINTMENT (OUTPATIENT)
Dept: LAB | Facility: OTHER | Age: 66
End: 2021-03-16
Attending: INTERNAL MEDICINE

## 2021-03-16 ENCOUNTER — APPOINTMENT (OUTPATIENT)
Dept: FAMILY MEDICINE | Facility: OTHER | Age: 66
End: 2021-03-16
Attending: CHIROPRACTOR

## 2021-03-16 LAB
ALBUMIN SERPL-MCNC: 4.2 G/DL (ref 3.5–5.7)
ALBUMIN UR-MCNC: 10 MG/DL
ALP SERPL-CCNC: 62 U/L (ref 34–104)
ALT SERPL W P-5'-P-CCNC: 53 U/L (ref 7–52)
ANION GAP SERPL CALCULATED.3IONS-SCNC: 5 MMOL/L (ref 3–14)
APPEARANCE UR: CLEAR
AST SERPL W P-5'-P-CCNC: 33 U/L (ref 13–39)
BILIRUB SERPL-MCNC: 1.2 MG/DL (ref 0.3–1)
BILIRUB UR QL STRIP: NEGATIVE
BUN SERPL-MCNC: 22 MG/DL (ref 7–25)
CALCIUM SERPL-MCNC: 9.4 MG/DL (ref 8.6–10.3)
CHLORIDE SERPL-SCNC: 104 MMOL/L (ref 98–107)
CHOLEST SERPL-MCNC: 155 MG/DL
CO2 SERPL-SCNC: 30 MMOL/L (ref 21–31)
COLOR UR AUTO: YELLOW
CREAT SERPL-MCNC: 1.09 MG/DL (ref 0.7–1.3)
GFR SERPL CREATININE-BSD FRML MDRD: 68 ML/MIN/{1.73_M2}
GLUCOSE SERPL-MCNC: 106 MG/DL (ref 70–105)
GLUCOSE UR STRIP-MCNC: NEGATIVE MG/DL
HDLC SERPL-MCNC: 40 MG/DL (ref 23–92)
HGB UR QL STRIP: NEGATIVE
KETONES UR STRIP-MCNC: NEGATIVE MG/DL
LDLC SERPL CALC-MCNC: 90 MG/DL
LEUKOCYTE ESTERASE UR QL STRIP: NEGATIVE
MUCOUS THREADS #/AREA URNS LPF: PRESENT /LPF
NITRATE UR QL: NEGATIVE
NONHDLC SERPL-MCNC: 115 MG/DL
PH UR STRIP: 5.5 PH (ref 5–7)
POTASSIUM SERPL-SCNC: 4.3 MMOL/L (ref 3.5–5.1)
PROT SERPL-MCNC: 7 G/DL (ref 6.4–8.9)
PSA SERPL-MCNC: 1.82 NG/ML
RBC #/AREA URNS AUTO: <1 /HPF (ref 0–2)
SODIUM SERPL-SCNC: 139 MMOL/L (ref 134–144)
SOURCE: ABNORMAL
SP GR UR STRIP: 1.03 (ref 1–1.03)
TRIGL SERPL-MCNC: 124 MG/DL
UROBILINOGEN UR STRIP-MCNC: NORMAL MG/DL (ref 0–2)
WBC #/AREA URNS AUTO: 5 /HPF (ref 0–5)

## 2021-03-16 PROCEDURE — 93005 ELECTROCARDIOGRAM TRACING: CPT | Performed by: INTERNAL MEDICINE

## 2021-03-16 PROCEDURE — 99499 UNLISTED E&M SERVICE: CPT | Performed by: CHIROPRACTOR

## 2021-09-03 NOTE — TELEPHONE ENCOUNTER
Pepito has a March 16, Appointment for Fasting labs and HD appointment.        Edita Hayward on 3/2/2021 at 11:40 AM     Unknown if ever smoked

## 2021-11-24 ENCOUNTER — IMMUNIZATION (OUTPATIENT)
Dept: FAMILY MEDICINE | Facility: OTHER | Age: 66
End: 2021-11-24
Attending: FAMILY MEDICINE
Payer: COMMERCIAL

## 2021-11-24 PROCEDURE — 0004A PR COVID VAC PFIZER DIL RECON 30 MCG/0.3 ML IM: CPT

## 2021-11-24 PROCEDURE — 91300 PR COVID VAC PFIZER DIL RECON 30 MCG/0.3 ML IM: CPT

## 2022-01-26 ENCOUNTER — OFFICE VISIT (OUTPATIENT)
Dept: INTERNAL MEDICINE | Facility: OTHER | Age: 67
End: 2022-01-26
Attending: INTERNAL MEDICINE
Payer: COMMERCIAL

## 2022-01-26 VITALS
SYSTOLIC BLOOD PRESSURE: 132 MMHG | OXYGEN SATURATION: 97 % | WEIGHT: 216 LBS | HEART RATE: 66 BPM | BODY MASS INDEX: 29.42 KG/M2 | TEMPERATURE: 96.5 F | RESPIRATION RATE: 16 BRPM | DIASTOLIC BLOOD PRESSURE: 84 MMHG

## 2022-01-26 DIAGNOSIS — E78.2 MIXED HYPERLIPIDEMIA: ICD-10-CM

## 2022-01-26 DIAGNOSIS — R74.01 ELEVATED ALT MEASUREMENT: ICD-10-CM

## 2022-01-26 DIAGNOSIS — N18.2 CKD (CHRONIC KIDNEY DISEASE) STAGE 2, GFR 60-89 ML/MIN: ICD-10-CM

## 2022-01-26 DIAGNOSIS — Z13.6 SCREENING FOR AAA (ABDOMINAL AORTIC ANEURYSM): ICD-10-CM

## 2022-01-26 DIAGNOSIS — G89.29 CHRONIC LEFT SHOULDER PAIN: ICD-10-CM

## 2022-01-26 DIAGNOSIS — Z23 NEEDS FLU SHOT: ICD-10-CM

## 2022-01-26 DIAGNOSIS — Z12.11 COLON CANCER SCREENING: ICD-10-CM

## 2022-01-26 DIAGNOSIS — Z23 NEED FOR 23-POLYVALENT PNEUMOCOCCAL POLYSACCHARIDE VACCINE: ICD-10-CM

## 2022-01-26 DIAGNOSIS — Z00.00 WELCOME TO MEDICARE PREVENTIVE VISIT: Primary | ICD-10-CM

## 2022-01-26 DIAGNOSIS — M25.512 CHRONIC LEFT SHOULDER PAIN: ICD-10-CM

## 2022-01-26 LAB
ALBUMIN SERPL-MCNC: 4 G/DL (ref 3.5–5.7)
ALP SERPL-CCNC: 60 U/L (ref 34–104)
ALT SERPL W P-5'-P-CCNC: 48 U/L (ref 7–52)
ANION GAP SERPL CALCULATED.3IONS-SCNC: 3 MMOL/L (ref 3–14)
AST SERPL W P-5'-P-CCNC: 32 U/L (ref 13–39)
BILIRUB SERPL-MCNC: 1.2 MG/DL (ref 0.3–1)
BUN SERPL-MCNC: 22 MG/DL (ref 7–25)
CALCIUM SERPL-MCNC: 9.5 MG/DL (ref 8.6–10.3)
CHLORIDE BLD-SCNC: 105 MMOL/L (ref 98–107)
CHOLEST SERPL-MCNC: 146 MG/DL
CO2 SERPL-SCNC: 30 MMOL/L (ref 21–31)
CREAT SERPL-MCNC: 1.07 MG/DL (ref 0.7–1.3)
FASTING STATUS PATIENT QL REPORTED: YES
GFR SERPL CREATININE-BSD FRML MDRD: 77 ML/MIN/1.73M2
GLUCOSE BLD-MCNC: 98 MG/DL (ref 70–105)
HDLC SERPL-MCNC: 37 MG/DL (ref 23–92)
LDLC SERPL CALC-MCNC: 83 MG/DL
NONHDLC SERPL-MCNC: 109 MG/DL
POTASSIUM BLD-SCNC: 4.5 MMOL/L (ref 3.5–5.1)
PROT SERPL-MCNC: 6.5 G/DL (ref 6.4–8.9)
SODIUM SERPL-SCNC: 138 MMOL/L (ref 134–144)
TRIGL SERPL-MCNC: 129 MG/DL

## 2022-01-26 PROCEDURE — G0463 HOSPITAL OUTPT CLINIC VISIT: HCPCS

## 2022-01-26 PROCEDURE — G0402 INITIAL PREVENTIVE EXAM: HCPCS | Performed by: INTERNAL MEDICINE

## 2022-01-26 PROCEDURE — G0402 INITIAL PREVENTIVE EXAM: HCPCS | Mod: 25 | Performed by: INTERNAL MEDICINE

## 2022-01-26 PROCEDURE — 80053 COMPREHEN METABOLIC PANEL: CPT | Mod: ZL | Performed by: INTERNAL MEDICINE

## 2022-01-26 PROCEDURE — G0008 ADMIN INFLUENZA VIRUS VAC: HCPCS

## 2022-01-26 PROCEDURE — 90662 IIV NO PRSV INCREASED AG IM: CPT

## 2022-01-26 PROCEDURE — 80061 LIPID PANEL: CPT | Mod: ZL | Performed by: INTERNAL MEDICINE

## 2022-01-26 PROCEDURE — 36415 COLL VENOUS BLD VENIPUNCTURE: CPT | Mod: ZL | Performed by: INTERNAL MEDICINE

## 2022-01-26 RX ORDER — ATORVASTATIN CALCIUM 20 MG/1
20 TABLET, FILM COATED ORAL DAILY
Qty: 90 TABLET | Refills: 3 | Status: SHIPPED | OUTPATIENT
Start: 2022-01-26 | End: 2023-01-27

## 2022-01-26 ASSESSMENT — ENCOUNTER SYMPTOMS
AGITATION: 0
BRUISES/BLEEDS EASILY: 0
CONFUSION: 0
COUGH: 0
ABDOMINAL PAIN: 0
SHORTNESS OF BREATH: 0
NAUSEA: 0
FEVER: 0
WOUND: 0
DIZZINESS: 0
VOMITING: 0
DIARRHEA: 0
ARTHRALGIAS: 1
MYALGIAS: 0
WHEEZING: 0
PALPITATIONS: 0
CHILLS: 0
DYSURIA: 0
HEMATURIA: 0
LIGHT-HEADEDNESS: 0

## 2022-01-26 ASSESSMENT — ACTIVITIES OF DAILY LIVING (ADL): CURRENT_FUNCTION: NO ASSISTANCE NEEDED

## 2022-01-26 ASSESSMENT — PAIN SCALES - GENERAL: PAINLEVEL: MILD PAIN (2)

## 2022-01-26 NOTE — PROGRESS NOTES
Assessment & Plan       ICD-10-CM    1. Welcome to Medicare preventive visit  Z00.00    2. Screening for AAA (abdominal aortic aneurysm)  Z13.6  Aorta Medicare AAA Screening   3. Chronic left shoulder pain  M25.512     G89.29    4. Mixed hyperlipidemia  E78.2 atorvastatin (LIPITOR) 20 MG tablet     Lipid Panel     Lipid Panel   5. Colon cancer screening  Z12.11 COLOGUARD(EXACT SCIENCES)   6. Elevated ALT measurement  R74.01 Comprehensive Metabolic Panel     Comprehensive Metabolic Panel   7. CKD (chronic kidney disease) stage 2, GFR 60-89 ml/min  N18.2    8. Need for 23-polyvalent pneumococcal polysaccharide vaccine  Z23 GH IMM-  PNEUMOCOCCAL VACCINE,ADULT,SQ OR IM   9. Needs flu shot  Z23 FLU SHOT 65+ (FLUZONE HD)     Patient presents for his welcome to Medicare preventative visit.  Overall doing quite well.  He needs some health screening studies ordered today.    Continue work aneurysm ultrasound screening is due, orders placed.  One-time testing after age 65.    Still has some intermittent left shoulder pain.  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  He tries to limit his eye movement into certain positions only to limit the pain.  Overall seems to be doing well.  Declines need for further evaluation at this time.    Mixed hyperlipidemia, has been doing well with Lipitor.  Needs refills.  Check labs.    Colon cancer screening is due.  He did Cologuard screening 3 years ago which was negative.  Repeat today.  Orders placed.    History of elevated ALT.  Check metabolic panel today.    Stage II chronic kidney disease.  Noted with previous lab work.  Kidney function has been slowly declining.  Encourage NSAID avoidance.  Recheck labs today.    Pneumococcal and flu shot today.    Encouraged weight loss and regular exercise.     Return in about 53 weeks (around 2/1/2023) for Annual Wellness Visit -- Initial (1st visit).    Bo Cortes MD  [facility]  "[facility]      Subjective   Pepito is a 67 year old who presents for the following health issues     Healthy Habits:     In general, how would you rate your overall health?  Good    Frequency of exercise:  4-5 days/week    Duration of exercise:  Greater than 60 minutes    Do you usually eat at least 4 servings of fruit and vegetables a day, include whole grains    & fiber and avoid regularly eating high fat or \"junk\" foods?  No    Taking medications regularly:  Yes    Medication side effects:  None    Ability to successfully perform activities of daily living:  No assistance needed    Home Safety:  No safety concerns identified    Hearing Impairment:  Difficulty understanding soft or whispered speech    In the past 6 months, have you been bothered by leaking of urine?  No    In general, how would you rate your overall mental or emotional health?  Good      PHQ-2 Total Score: 0    Additional concerns today:  Yes       Annual Wellness Visit  Patient has been advised of split billing requirements and indicates understanding: Yes     Are you in the first 12 months of your Medicare Part B coverage?  Yes,  Wears Glasses - Visual Acuity:  Right Eye: 20/20   Left Eye: 20/20  Both Eyes: 20/20    Do you feel safe in your environment? Yes    Have you ever done Advance Care Planning? (For example, a Health Directive, POLST, or a discussion with a medical provider or your loved ones about your wishes)? No, advance care planning information given to patient to review.  Patient plans to discuss their wishes with loved ones or provider.      Fall risk:  Fallen 2 or more times in the past year?: Yes  Any fall with injury in the past year?: No    Cognitive Screenin) Repeat 3 items (Leader, Season, Table)    2) Clock draw: NORMAL  3) 3 item recall: Recalls 3 objects  Results: 3 items recalled: COGNITIVE IMPAIRMENT LESS LIKELY    Mini-CogTM Copyright S Irvin. Licensed by the author for use in [facility]; reprinted " "with permission ([email or web address]@.[email or web address]). All rights reserved.      Do you have sleep apnea, excessive snoring or daytime drowsiness?: no    Current providers sharing in care for this patient include:   Patient Care Team:  Bo Cortes MD as PCP - General (Internal Medicine)  Bo Cortes MD as Assigned PCP    Patient has been advised of split billing requirements and indicates understanding: Yes      Review of Systems   Constitutional: Negative for chills and fever.   HENT: Negative for congestion and hearing loss.    Eyes: Negative for visual disturbance.   Respiratory: Negative for cough, shortness of breath and wheezing.    Cardiovascular: Negative for chest pain and palpitations.   Gastrointestinal: Negative for abdominal pain, diarrhea, nausea and vomiting.   Endocrine: Negative for cold intolerance and heat intolerance.   Genitourinary: Negative for dysuria and hematuria.   Musculoskeletal: Positive for arthralgias (left shoulder pain with activity -- described as \"tingling\" really only in certain positions). Negative for myalgias.   Skin: Negative for rash and wound.   Allergic/Immunologic: Negative for immunocompromised state.   Neurological: Negative for dizziness and light-headedness.   Hematological: Does not bruise/bleed easily.   Psychiatric/Behavioral: Negative for agitation and confusion.          Objective    /84 (BP Location: Right arm, Patient Position: Sitting, Cuff Size: Adult Regular)   Pulse 66   Temp (!) 96.5  F (35.8  C) (Tympanic)   Resp 16   Wt 98 kg (216 lb)   SpO2 97%   BMI 29.42 kg/m    Body mass index is 29.42 kg/m .  Physical Exam  Constitutional:       General: He is not in acute distress.     Appearance: He is well-developed. He is not diaphoretic.   HENT:      Head: Normocephalic and atraumatic.   Eyes:      General: No scleral icterus.     Conjunctiva/sclera: Conjunctivae normal.   Neck:      Vascular: No carotid bruit.   Cardiovascular:      Rate and Rhythm: Normal rate " and regular rhythm.      Pulses: Normal pulses.   Pulmonary:      Effort: Pulmonary effort is normal.      Breath sounds: Normal breath sounds.   Abdominal:      Palpations: Abdomen is soft.      Tenderness: There is no abdominal tenderness.   Musculoskeletal:         General: No deformity.      Cervical back: Neck supple.      Right lower leg: No edema.      Left lower leg: No edema.      Comments: Problems with abduction/flexion of left shoulder   Lymphadenopathy:      Cervical: No cervical adenopathy.   Skin:     General: Skin is warm and dry.      Findings: No rash.   Neurological:      Mental Status: He is alert and oriented to person, place, and time. Mental status is at baseline.   Psychiatric:         Mood and Affect: Mood normal.         Behavior: Behavior normal.          Recent Labs   Lab Test 01/26/22  1003 03/16/21  0854 01/25/21  0843   A1C  --   --  5.3   LDL 83 90 60   HDL 37 40 37   TRIG 129 124 178*   ALT 48 53* 57*   CR 1.07 1.09 1.21   GFRESTIMATED 77 68 60*   GFRESTBLACK  --  82 73   POTASSIUM 4.5 4.3 4.4   Cholesterol panel is at goal.  No changes.  ALT has normalized.  Creatinine has improved slightly.  Potassium is normal.            He is at risk for falling and has been provided with information to reduce the risk of falling at home.

## 2022-01-26 NOTE — NURSING NOTE
Pt presents to clinic today for annual physical.      FOOD SECURITY SCREENING QUESTIONS:    The next two questions are to help us understand your food security.  If you are feeling you need any assistance in this area, we have resources available to support you today.    Hunger Vital Signs:  Within the past 12 months we worried whether our food would run out before we got money to buy more. Never  Within the past 12 months the food we bought just didn't last and we didn't have money to get more. Never    Medication Reconciliation: complete  Annie Perry LPN,LPN on 1/26/2022 at 9:01 AM

## 2022-01-26 NOTE — LETTER
January 31, 2022      Pepito Harman  [address] [address]        Dear ,    We are writing to inform you of your test results.    Labs are stable.   Continue current medications.     Electronically signed by:  Bo Cortes MD  on January 31, 2022     Resulted Orders   Comprehensive Metabolic Panel   Result Value Ref Range    Sodium 138 134 - 144 mmol/L    Potassium 4.5 3.5 - 5.1 mmol/L    Chloride 105 98 - 107 mmol/L    Carbon Dioxide (CO2) 30 21 - 31 mmol/L    Anion Gap 3 3 - 14 mmol/L    Urea Nitrogen 22 7 - 25 mg/dL    Creatinine 1.07 0.70 - 1.30 mg/dL    Calcium 9.5 8.6 - 10.3 mg/dL    Glucose 98 70 - 105 mg/dL    Alkaline Phosphatase 60 34 - 104 U/L    AST 32 13 - 39 U/L    ALT 48 7 - 52 U/L    Protein Total 6.5 6.4 - 8.9 g/dL    Albumin 4.0 3.5 - 5.7 g/dL    Bilirubin Total 1.2 (H) 0.3 - 1.0 mg/dL    GFR Estimate 77 >60 mL/min/1.73m2      Comment:      Effective December 21, 2021 eGFRcr in adults is calculated using the 2021 CKD-EPI creatinine equation which includes age and gender (Jeanne et al., NEJ, DOI: 10.1056/GTVPbx9375985)   Lipid Panel   Result Value Ref Range    Cholesterol 146 <200 mg/dL    Triglycerides 129 <150 mg/dL    Direct Measure HDL 37 23 - 92 mg/dL    LDL Cholesterol Calculated 83 <=100 mg/dL    Non HDL Cholesterol 109 <130 mg/dL    Patient Fasting > 8hrs? Yes     Narrative    Cholesterol  Desirable:  <200 mg/dL    Triglycerides  Normal:  Less than 150 mg/dL  Borderline High:  150-199 mg/dL  High:  200-499 mg/dL  Very High:  Greater than or equal to 500 mg/dL    Direct Measure HDL  Female:  Greater than or equal to 50 mg/dL   Male:  Greater than or equal to 40 mg/dL    LDL Cholesterol  Desirable:  <100mg/dL  Above Desirable:  100-129 mg/dL   Borderline High:  130-159 mg/dL   High:  160-189 mg/dL   Very High:  >= 190 mg/dL    Non HDL Cholesterol  Desirable:  130 mg/dL  Above Desirable:  130-159 mg/dL  Borderline High:  160-189 mg/dL  High:  190-219  mg/dL  Very High:  Greater than or equal to 220 mg/dL       If you have any questions or concerns, please call the clinic at the number listed above.       Sincerely,      Bo Cortes MD

## 2022-01-26 NOTE — PATIENT INSTRUCTIONS
Patient Education   Personalized Prevention Plan  You are due for the preventive services outlined below.  Your care team is available to assist you in scheduling these services.  If you have already completed any of these items, please share that information with your care team to update in your medical record.  Health Maintenance Due   Topic Date Due     FALL RISK ASSESSMENT  01/12/2022     AORTIC ANEURYSM SCREENING (SYSTEM ASSIGNED)  Never done     Pneumococcal Vaccine (1 of 1 - PPSV23) 01/12/2022     Preventive Health Recommendations  See your health care provider every year to    Review health changes.     Discuss preventive care.      Review your medicines if your doctor has prescribed any.    Talk with your health care provider about whether you should have a test to screen for prostate cancer (PSA).    Every 3 years, have a diabetes test (fasting glucose). If you are at risk for diabetes, you should have this test more often.    Every 5 years, have a cholesterol test. Have this test more often if you are at risk for high cholesterol or heart disease.     Every 10 years, have a colonoscopy. Or, have a yearly FIT test (stool test). These exams will check for colon cancer.    Talk to with your health care provider about screening for Abdominal Aortic Aneurysm if you have a family history of AAA or have a history of smoking.    Shots:     Get a flu shot each year.     Get a tetanus shot every 10 years.     Talk to your doctor about your pneumonia vaccines. There are now two you should receive - Pneumovax (PPSV 23) and Prevnar (PCV 13).    Talk to your pharmacist about a shingles vaccine.     Talk to your doctor about the hepatitis B vaccine.    Nutrition:     Eat at least 5 servings of fruits and vegetables each day.     Eat whole-grain bread, whole-wheat pasta and brown rice instead of white grains and rice.     Get adequate Calcium and Vitamin D.     Lifestyle    Exercise for at least 150 minutes a week (30  minutes a day, 5 days a week). This will help you control your weight and prevent disease.     Limit alcohol to one drink per day.     No smoking.     Wear sunscreen to prevent skin cancer.     See your dentist every six months for an exam and cleaning.     See your eye doctor every 1 to 2 years to screen for conditions such as glaucoma, macular degeneration and cataracts.    Personalized Prevention Plan  You are due for the preventive services outlined below.  Your care team is available to assist you in scheduling these services.  If you have already completed any of these items, please share that information with your care team to update in your medical record.  Health Maintenance   Topic Date Due     FALL RISK ASSESSMENT  01/12/2022     AORTIC ANEURYSM SCREENING (SYSTEM ASSIGNED)  Never done     Pneumococcal Vaccine: 65+ Years (1 of 1 - PPSV23) 01/12/2022     MEDICARE ANNUAL WELLNESS VISIT  01/26/2023     ADVANCE CARE PLANNING  02/08/2024     LIPID  03/16/2026     COLORECTAL CANCER SCREENING  03/30/2029     DTAP/TDAP/TD IMMUNIZATION (2 - Td or Tdap) 07/06/2031     HEPATITIS C SCREENING  Completed     PHQ-2  Completed     ZOSTER IMMUNIZATION  Completed     COVID-19 Vaccine  Completed     HIV SCREENING  Addressed     Pneumococcal Vaccine: Pediatrics (0 to 5 Years) and At-Risk Patients (6 to 64 Years)  Aged Out     IPV IMMUNIZATION  Aged Out     MENINGITIS IMMUNIZATION  Aged Out     HEPATITIS B IMMUNIZATION  Aged Out     INFLUENZA VACCINE  Discontinued       Preventing Falls at Home  A person can fall for many reasons. Older adults may fall because reaction time slows down as we age. Your muscles and joints may get stiff, weak, or less flexible because of illness, medicines, or a physical condition.   Other health problems that make falls more likely include:     Arthritis    Dizziness or lightheadedness when you stand up (orthostatic hypotension)    History of a stroke    Dizziness    Anemia    Certain medicines  taken for mental illness or to control blood pressure.    Problems with balance or gait    Bladder or urinary problems    History of falling    Changes in vision (vision impairment)    Changes in thinking skills and memory (cognitive impairment)  Injuries from a fall can include serious injuries such as broken bones, dislocated joints, internal bleeding and cuts. Injuries like these can limit your independence.   Prevention tips  To help prevent falls and fall-related injuries, follow the tips below.    Floors  To make floors safer:     Put nonskid pads under area rugs.    Remove small rugs.    Replace worn floor coverings.    Tack carpets firmly to each step on carpeted stairs. Put nonskid strips on the edges of uncarpeted stairs.    Keep floors and stairs free of clutter and cords.    Arrange furniture so there are clear pathways.    Clean up any spills right away.  Bathrooms    To make bathrooms safer:     Install grab bars in the tub or shower.    Apply nonskid strips or put a nonskid rubber mat in the tub or shower.    Sit on a bath chair to bathe.    Use bathmats with nonskid backing.  Lighting  To improve visibility in your home:      Keep a flashlight in each room. Or put a lamp next to the bed within easy reach.    Put nightlights in the bedrooms, hallways, kitchen, and bathrooms.    Make sure all stairways have good lighting.    Take your time when going up and down stairs.    Put handrails on both sides of stairs and in walkways for more support. To prevent injury to your wrist or arm, don t use handrails to pull yourself up.    Install grab bars to pull yourself up.    Move or rearrange items that you use often. This will make them easier to find or reach.    Look at your home to find any safety hazards. Especially look at doorways, walkways, and the driveway. Remove or repair any safety problems that you find.  Other changes to make    Look around to find any safety hazards. Look closely at doorways,  walkways, and the driveway. Remove or repair any safety problems that you find.    Wear shoes that fit well.    Take your time when going up and down stairs.    Put handrails on both sides of stairs and in walkways for more support. To prevent injury to your wrist or arm, don t use handrails to pull yourself up.    Install grab bars wherever needed to pull yourself up.    Arrange items that you use often. This will make them easier to find or reach.    [employer] last reviewed this educational content on 3/1/2020    6980-6568 [employer]. All rights reserved. This information is not intended as a substitute for professional medical care. Always follow your healthcare professional's instructions.            + Cologuard stool blood testing ordered -- mail in sample once you get the kit in the mail.   -- if positive, will need to schedule colonoscopy.   -- if negative, no blood noted, recheck in 3 years.      Ultrasound abdominal aortic aneurysm ordered  - they will call with date/time of appointment.      Blood pressure is well controlled.     Medications refilled.   Labs are pending.       Immunization History   Administered Date(s) Administered     COVID-19,PF,Pfizer (12+ Yrs) 03/31/2021, 04/28/2021, 11/24/2021     Influenza (IIV3) PF 12/22/2009     TDAP Vaccine (Boostrix) 07/06/2021     Zoster vaccine recombinant adjuvanted (SHINGRIX) 01/26/2021, 07/06/2021      -- Get the 2 shot COVID-19 Vaccination series (1st shot when available and 2nd shot 21 to 30 days later)    -- Consider the **Pfizer or Moderna** vaccine --    -- The COVID-19 Booster shots are here.  (Does not have to be the same vaccine as before).    -- Moderna and Pfizer 3rd shots:   - If immunocompromised, can get 3rd shot -- 28+ days from date of 2nd shot.   - Otherwise - vaccine schedule is to get 3rd shot -- about 6 months from date of 2nd shot.     -- Consider Annual Flu / Influenza vaccination - Every Fall (Starting about October  1st)    -- Pneumonia / Pneumococcal PCV 23 vaccines are done / completed --- after today.     -- Flu shot today.        Return in approximately 1 year, or sooner as needed for follow-up with Dr. Cortes.  - Annual Follow-up / Physical - Medicare Annual Wellness Visit     Clinic : [phone]  Appointment line: [phone]

## 2022-02-02 ENCOUNTER — HOSPITAL ENCOUNTER (OUTPATIENT)
Dept: ULTRASOUND IMAGING | Facility: OTHER | Age: 67
Discharge: HOME | End: 2022-02-02
Attending: INTERNAL MEDICINE | Admitting: INTERNAL MEDICINE
Payer: COMMERCIAL

## 2022-02-02 DIAGNOSIS — Z13.6 SCREENING FOR AAA (ABDOMINAL AORTIC ANEURYSM): ICD-10-CM

## 2022-02-02 LAB — COLOGUARD-ABSTRACT: POSITIVE

## 2022-02-02 PROCEDURE — 76706 US ABDL AORTA SCREEN AAA: CPT

## 2022-02-11 ENCOUNTER — TELEPHONE (OUTPATIENT)
Dept: INTERNAL MEDICINE | Facility: OTHER | Age: 67
End: 2022-02-11
Payer: COMMERCIAL

## 2022-02-11 NOTE — TELEPHONE ENCOUNTER
[facility] [facility] called stating patient  Had an abnormal  cologuard result, they faxed over information but wanted note sent back to PCP incase he did not receive it he could call them back at [phone]    Lynette Reese on 2/11/2022 at 2:48 PM

## 2022-02-11 NOTE — TELEPHONE ENCOUNTER
I printed the results letter to you and put it in your in box.  Diana Barksdale LPN..................2/11/2022   3:40 PM

## 2022-02-13 DIAGNOSIS — R19.5 POSITIVE COLORECTAL CANCER SCREENING USING COLOGUARD TEST: Primary | ICD-10-CM

## 2022-02-15 NOTE — TELEPHONE ENCOUNTER
Positive result. Message left to let patient know this.  Lynette Mckeon CMA([facility])..................2/15/2022   11:45 AM

## 2022-02-15 NOTE — PROGRESS NOTES
Left message to call back to inform him that the test was positive and the referral has been made.  Lynette Mckeon CMA([facility]) ....................  2/15/2022   11:47 AM

## 2022-02-16 DIAGNOSIS — Z12.11 SCREENING FOR COLON CANCER: Primary | ICD-10-CM

## 2022-02-16 NOTE — TELEPHONE ENCOUNTER
Screening Questions for the Scheduling of Screening Colonoscopies   (If Colonoscopy is diagnostic, Provider should review the chart before scheduling.)  Are you younger than 50 or older than 80?  NO  Do you take aspirin or fish oil?  YES ASPIRIN FISH OIL (if yes, tell patient to stop 1 week prior to Colonoscopy)  Do you take warfarin (Coumadin), clopidogrel (Plavix), apixaban (Eliquis), dabigatram (Pradaxa), rivaroxaban (Xarelto) or any blood thinner? NO  Do you use oxygen at home?  NO  Do you have kidney disease? NO  Are you on dialysis? NO  Have you had a stroke or heart attack in the last year? NO  Have you had a stent in your heart or any blood vessel in the last year? NO  Have you had a transplant of any organ? NO  Have you had a colonoscopy or upper endoscopy (EGD) before? YES         When?  03/23/2007  Date of scheduled Colonoscopy. 03/01/2022  Provider [facility]  Pharm[facility]

## 2022-02-17 RX ORDER — POLYETHYLENE GLYCOL 3350, SODIUM CHLORIDE, SODIUM BICARBONATE, POTASSIUM CHLORIDE 420; 11.2; 5.72; 1.48 G/4L; G/4L; G/4L; G/4L
4000 POWDER, FOR SOLUTION ORAL ONCE
Qty: 4000 ML | Refills: 0 | Status: SHIPPED | OUTPATIENT
Start: 2022-02-17 | End: 2022-02-17

## 2022-02-17 RX ORDER — BISACODYL 5 MG/1
TABLET, DELAYED RELEASE ORAL
Qty: 2 TABLET | Refills: 0 | Status: SHIPPED | OUTPATIENT
Start: 2022-02-17 | End: 2023-02-01

## 2022-02-25 ENCOUNTER — ALLIED HEALTH/NURSE VISIT (OUTPATIENT)
Dept: FAMILY MEDICINE | Facility: OTHER | Age: 67
End: 2022-02-25
Attending: SURGERY
Payer: COMMERCIAL

## 2022-02-25 DIAGNOSIS — Z12.11 SCREENING FOR COLON CANCER: ICD-10-CM

## 2022-02-25 PROCEDURE — U0003 INFECTIOUS AGENT DETECTION BY NUCLEIC ACID (DNA OR RNA); SEVERE ACUTE RESPIRATORY SYNDROME CORONAVIRUS 2 (SARS-COV-2) (CORONAVIRUS DISEASE [COVID-19]), AMPLIFIED PROBE TECHNIQUE, MAKING USE OF HIGH THROUGHPUT TECHNOLOGIES AS DESCRIBED BY CMS-2020-01-R: HCPCS | Mod: ZL

## 2022-02-25 PROCEDURE — C9803 HOPD COVID-19 SPEC COLLECT: HCPCS

## 2022-02-25 NOTE — PROGRESS NOTES
Patient here for Covid Testing. Pre op 3/1/22 ALAN Mackey.    Felicitas Mccord MA on 2/25/2022 at 9:56 AM

## 2022-02-26 LAB — SARS-COV-2 RNA RESP QL NAA+PROBE: NEGATIVE

## 2022-03-01 ENCOUNTER — ANESTHESIA EVENT (OUTPATIENT)
Dept: SURGERY | Facility: OTHER | Age: 67
End: 2022-03-01
Payer: COMMERCIAL

## 2022-03-01 ENCOUNTER — HOSPITAL ENCOUNTER (OUTPATIENT)
Facility: OTHER | Age: 67
Discharge: HOME | End: 2022-03-01
Attending: SURGERY | Admitting: SURGERY
Payer: COMMERCIAL

## 2022-03-01 ENCOUNTER — ANESTHESIA (OUTPATIENT)
Dept: SURGERY | Facility: OTHER | Age: 67
End: 2022-03-01
Payer: COMMERCIAL

## 2022-03-01 VITALS
TEMPERATURE: 96.6 F | RESPIRATION RATE: 18 BRPM | BODY MASS INDEX: 30.24 KG/M2 | HEART RATE: 64 BPM | OXYGEN SATURATION: 91 % | HEIGHT: 71 IN | DIASTOLIC BLOOD PRESSURE: 90 MMHG | WEIGHT: 216 LBS | SYSTOLIC BLOOD PRESSURE: 129 MMHG

## 2022-03-01 PROCEDURE — 250N000009 HC RX 250: Performed by: NURSE ANESTHETIST, CERTIFIED REGISTERED

## 2022-03-01 PROCEDURE — 258N000003 HC RX IP 258 OP 636: Performed by: SURGERY

## 2022-03-01 PROCEDURE — 45380 COLONOSCOPY AND BIOPSY: CPT | Performed by: SURGERY

## 2022-03-01 PROCEDURE — 999N000010 HC STATISTIC ANES STAT CODE-CRNA PER MINUTE: Performed by: SURGERY

## 2022-03-01 PROCEDURE — 88305 TISSUE EXAM BY PATHOLOGIST: CPT

## 2022-03-01 PROCEDURE — 250N000011 HC RX IP 250 OP 636: Performed by: NURSE ANESTHETIST, CERTIFIED REGISTERED

## 2022-03-01 PROCEDURE — 45385 COLONOSCOPY W/LESION REMOVAL: CPT | Performed by: NURSE ANESTHETIST, CERTIFIED REGISTERED

## 2022-03-01 PROCEDURE — 45385 COLONOSCOPY W/LESION REMOVAL: CPT | Performed by: SURGERY

## 2022-03-01 RX ORDER — LIDOCAINE HYDROCHLORIDE 20 MG/ML
INJECTION, SOLUTION INFILTRATION; PERINEURAL PRN
Status: DISCONTINUED | OUTPATIENT
Start: 2022-03-01 | End: 2022-03-01

## 2022-03-01 RX ORDER — SODIUM CHLORIDE, SODIUM LACTATE, POTASSIUM CHLORIDE, CALCIUM CHLORIDE 600; 310; 30; 20 MG/100ML; MG/100ML; MG/100ML; MG/100ML
INJECTION, SOLUTION INTRAVENOUS CONTINUOUS
Status: DISCONTINUED | OUTPATIENT
Start: 2022-03-01 | End: 2022-03-01 | Stop reason: HOSPADM

## 2022-03-01 RX ORDER — LIDOCAINE 40 MG/G
CREAM TOPICAL
Status: DISCONTINUED | OUTPATIENT
Start: 2022-03-01 | End: 2022-03-01 | Stop reason: HOSPADM

## 2022-03-01 RX ORDER — NALOXONE HYDROCHLORIDE 0.4 MG/ML
0.4 INJECTION, SOLUTION INTRAMUSCULAR; INTRAVENOUS; SUBCUTANEOUS
Status: CANCELLED | OUTPATIENT
Start: 2022-03-01

## 2022-03-01 RX ORDER — PROPOFOL 10 MG/ML
INJECTION, EMULSION INTRAVENOUS PRN
Status: DISCONTINUED | OUTPATIENT
Start: 2022-03-01 | End: 2022-03-01

## 2022-03-01 RX ORDER — PROPOFOL 10 MG/ML
INJECTION, EMULSION INTRAVENOUS CONTINUOUS PRN
Status: DISCONTINUED | OUTPATIENT
Start: 2022-03-01 | End: 2022-03-01

## 2022-03-01 RX ORDER — FLUMAZENIL 0.1 MG/ML
0.2 INJECTION, SOLUTION INTRAVENOUS
Status: CANCELLED | OUTPATIENT
Start: 2022-03-01 | End: 2022-03-01

## 2022-03-01 RX ORDER — NALOXONE HYDROCHLORIDE 0.4 MG/ML
0.2 INJECTION, SOLUTION INTRAMUSCULAR; INTRAVENOUS; SUBCUTANEOUS
Status: CANCELLED | OUTPATIENT
Start: 2022-03-01

## 2022-03-01 RX ADMIN — LIDOCAINE HYDROCHLORIDE 40 MG: 20 INJECTION, SOLUTION INFILTRATION; PERINEURAL at 10:52

## 2022-03-01 RX ADMIN — SODIUM CHLORIDE, POTASSIUM CHLORIDE, SODIUM LACTATE AND CALCIUM CHLORIDE: 600; 310; 30; 20 INJECTION, SOLUTION INTRAVENOUS at 10:10

## 2022-03-01 RX ADMIN — PROPOFOL 150 MCG/KG/MIN: 10 INJECTION, EMULSION INTRAVENOUS at 10:52

## 2022-03-01 RX ADMIN — PROPOFOL 150 MG: 10 INJECTION, EMULSION INTRAVENOUS at 10:52

## 2022-03-01 NOTE — H&P
GENERAL SURGERY CONSULTATION NOTE    Stas Harman   [address] [address]  67 year old  male    Primary Care Provider:  Bo Cortes      HPI: Stas Harman presents to day surgery in need of colonoscopy for positive FIT test.   Stas Harman denies family history of colon cancer. Patient denies change in bowel habits or blood in stools. Previous colonoscopy was 2007, patient states some polyps were removed.     REVIEW OF SYSTEMS:    GENERAL: No fevers or chills. Denies fatigue, recent weight loss.  HEENT: No sinus drainage. No changes with vision or hearing. No difficulty swallowing.   LYMPHATICS:  Noswollen nodes in axilla, neck or groin.  CARDIOVASCULAR: Denies chest pain, palpitations and dyspnea on exertion.  PULMONARY: No shortness of breath or cough. No increase in sputum production.  GI: Denies melena,bright red blood in stools. No hematemesis. No constipation or diarrhea.  : No dysuria or hematuria.  SKIN: No recent rashes or ulcers.   HEMATOLOGY:  No history of easy bruising or bleeding.  ENDOCRINE:  No history of diabetes or thyroid problems.  NEUROLOGY:  No history of seizures or headaches. No motor or sensory changes.        Patient Active Problem List   Diagnosis     AC (acromioclavicular) joint arthritis     Mixed hyperlipidemia     Impingement syndrome of shoulder     S/P shoulder surgery - Right     Chronic left shoulder pain     Decreased range of motion of left shoulder     CKD (chronic kidney disease) stage 2, GFR 60-89 ml/min     Elevated ALT measurement     Positive colorectal cancer screening using Cologuard test       Past Medical History:   Diagnosis Date     Affections of shoulder region     11/20/2014     Arthropathy of shoulder region     11/20/2014     Other postprocedural states     12/15/2014     Sprain of rotator cuff capsule     11/20/2014       Past Surgical History:   Procedure Laterality Date     COLONOSCOPY  03/23/2007    f/u 10 years      "EXTRACTION(S) DENTAL      No Comments Provided     OTHER SURGICAL HISTORY      12/15/2014,[id number],OTHER,Right     RELEASE CARPAL TUNNEL      No Comments Provided       Family History   Problem Relation Age of Onset     Genetic Disorder Other         Genetic,No FHx of DM       Social History     Social History Narrative     - Wife Candy    4 children.       Social History     Socioeconomic History     Marital status:      Spouse name: Candy     Number of children: Not on file     Years of education: Not on file     Highest education level: Not on file   Occupational History     Not on file   Tobacco Use     Smoking status: Never Smoker     Smokeless tobacco: Never Used   Vaping Use     Vaping Use: Never used   Substance and Sexual Activity     Alcohol use: No     Drug use: No     Sexual activity: Not Currently   Other Topics Concern     Parent/sibling w/ CABG, MI or angioplasty before 65F 55M? Not Asked   Social History Narrative     - Wife Candy    4 children.     Social Determinants of Health     Financial Resource Strain: Not on file   Food Insecurity: Not on file   Transportation Needs: Not on file   Physical Activity: Not on file   Stress: Not on file   Social Connections: Not on file   Intimate Partner Violence: Not on file   Housing Stability: Not on file       No current facility-administered medications on file prior to encounter.  aspirin (ASA) 325 MG tablet, Take 325 mg by mouth daily  atorvastatin (LIPITOR) 20 MG tablet, Take 1 tablet (20 mg) by mouth daily  bisacodyl (DULCOLAX) 5 MG EC tablet, Take as directed by colonoscopy prep instructions  Omega-3 Fatty Acids (FISH OIL PO), Take by mouth 2 times daily 2000mg twice daily          ALLERGIES/SENSITIVITIES:   Allergies   Allergen Reactions     Penicillins Rash     Childhood rash       PHYSICAL EXAM:     BP (!) 145/87   Temp 97.3  F (36.3  C) (Tympanic)   Resp 18   Ht 1.803 m (5' 11\")   Wt 98 kg (216 lb)   SpO2 93%   BMI 30.13 " kg/m      General Appearance:   Sitting up in bed, no apparent distress  HEENT: Pupils are equal and reactive, no scleral icterus   Heart & CV:  RRR, no murmur.  LUNGS: No increased work of breathing. Lungs are CTA B/L, no wheezing or crackles.  Abd:  soft, non-tender, no masses   Ext: no lower extremity edema   Neuro: alert and oriented, normal speech and mentation         CONSULTATION ASSESSMENT AND PLAN:    67 year old male with positive FIT test in need of diagnostic colonoscopy.      The technical details of colonoscopy were discussed with the patient along with the risks and benefits to include bleeding, perforation and incomplete study. Stas Harman demonstrated understanding and is willing to proceed.       Medhat Mackey MD on 3/1/2022 at 10:40 AM

## 2022-03-01 NOTE — OP NOTE
COLONOSCOPY PROCEDURE NOTE    DATE OF SERVICE: 3/1/2022    SURGEON: ELOISE Mackey MD     PRE-OP DIAGNOSIS:    Positive FIT    POST-OP DIAGNOSIS:    Polyps at cecum    PROCEDURE:   Colonoscopy with snare polypectomy    ASSISTANT:  Circulator: Kelsey Silva RN  Scrub Person: Courtney Mendoza    ANESTHESIA:  MAC                            Monitor Anesthesia CareCRNA Independent: Mic Wood APRN CRNA    INDICATION FOR THE PROCEDURE: The patient is a 67 year old male with positive FIT test. The patient has no other complaints.  After explaining the risks to include bleeding, perforation, potential inability to reach the cecum the patient wishes to proceed.    PROCEDURE: After adequate sedation, the patient was in the left lateral decubitus position.  Rectal exam was performed.  There was normal tone and no palpable masses.  The colonoscope was introduced into the rectum and advanced to the cecum with Mild difficulty.  The patient's prep was good.  The terminal cecum was reached.  The cecum, ascending, transverse, descending and sigmoid colon were significant for one 8mm flat polyp in the cecum removed with hot snare.  The scope was retroflexed in the rectum.  The anorectal junction was unremarkable.  The scope was straightened and removed.  The patient tolerated the procedure well.     ESTIMATED BLOOD LOSS: none    COMPLICATIONS:  None    TISSUE REMOVED:  Yes    RECOMMEND:    Follow-up pending pathology        ELOISE Mackey MD

## 2022-03-01 NOTE — ANESTHESIA POSTPROCEDURE EVALUATION
Patient: Stas Harman    Procedure: Procedure(s):  COLONOSCOPY, WITH POLYPECTOMY, WITH RESOLUTION CLIPPING       Anesthesia Type:  MAC    Note:  Disposition: Outpatient   Postop Pain Control: Uneventful            Sign Out: Well controlled pain   PONV: No   Neuro/Psych: Uneventful            Sign Out: Acceptable/Baseline neuro status   Airway/Respiratory: Uneventful            Sign Out: Acceptable/Baseline resp. status   CV/Hemodynamics: Uneventful            Sign Out: Acceptable CV status; No obvious hypovolemia; No obvious fluid overload   Other NRE: NONE   DID A NON-ROUTINE EVENT OCCUR? No           Last vitals:  Vitals Value Taken Time   /81 03/01/22 1130   Temp 96.6  F (35.9  C) 03/01/22 1117   Pulse 61 03/01/22 1130   Resp     SpO2 95 % 03/01/22 1139   Vitals shown include unvalidated device data.    Electronically Signed By: ARABELLA Mendenhall CRNA  March 1, 2022  11:40 AM

## 2022-03-01 NOTE — ANESTHESIA PREPROCEDURE EVALUATION
Anesthesia Pre-Procedure Evaluation    Patient: Stas Harman   MRN: [id number] : 1955        Procedure : Procedure(s):  COLONOSCOPY          Past Medical History:   Diagnosis Date     Affections of shoulder region     2014     Arthropathy of shoulder region     2014     Other postprocedural states     12/15/2014     Sprain of rotator cuff capsule     2014      Past Surgical History:   Procedure Laterality Date     COLONOSCOPY  2007    f/u 10 years     EXTRACTION(S) DENTAL      No Comments Provided     OTHER SURGICAL HISTORY      12/15/2014,[id number],OTHER,Right     RELEASE CARPAL TUNNEL      No Comments Provided      Allergies   Allergen Reactions     Penicillins Rash     Childhood rash      Social History     Tobacco Use     Smoking status: Never Smoker     Smokeless tobacco: Never Used   Substance Use Topics     Alcohol use: No      Wt Readings from Last 1 Encounters:   22 98 kg (216 lb)        Anesthesia Evaluation   Pt has had prior anesthetic.     No history of anesthetic complications       ROS/MED HX  ENT/Pulmonary:  - neg pulmonary ROS     Neurologic:  - neg neurologic ROS     Cardiovascular:     (+) Dyslipidemia -----    METS/Exercise Tolerance: >4 METS    Hematologic:  - neg hematologic  ROS     Musculoskeletal:  - neg musculoskeletal ROS     GI/Hepatic:     (+) bowel prep,     Renal/Genitourinary:     (+) renal disease, type: CRI,     Endo:  - neg endo ROS     Psychiatric/Substance Use:  - neg psychiatric ROS     Infectious Disease:  - neg infectious disease ROS     Malignancy:  - neg malignancy ROS     Other:  - neg other ROS          Physical Exam    Airway        Mallampati: I   TM distance: > 3 FB   Neck ROM: full   Mouth opening: > 3 cm    Respiratory Devices and Support         Dental  no notable dental history         Cardiovascular          Rhythm and rate: regular and normal     Pulmonary   pulmonary exam normal        breath sounds clear to auscultation            OUTSIDE LABS:  CBC:   Lab Results   Component Value Date    WBC 7.5 01/25/2021    HGB 17.4 01/25/2021    HGB 17.1 11/21/2014    HCT 50.8 01/25/2021    HCT 49.6 11/21/2014     01/25/2021     11/21/2014     BMP:   Lab Results   Component Value Date     01/26/2022     03/16/2021    POTASSIUM 4.5 01/26/2022    POTASSIUM 4.3 03/16/2021    CHLORIDE 105 01/26/2022    CHLORIDE 104 03/16/2021    CO2 30 01/26/2022    CO2 30 03/16/2021    BUN 22 01/26/2022    BUN 22 03/16/2021    CR 1.07 01/26/2022    CR 1.09 03/16/2021    GLC 98 01/26/2022     (H) 03/16/2021     COAGS: No results found for: PTT, INR, FIBR  POC: No results found for: BGM, HCG, HCGS  HEPATIC:   Lab Results   Component Value Date    ALBUMIN 4.0 01/26/2022    PROTTOTAL 6.5 01/26/2022    ALT 48 01/26/2022    AST 32 01/26/2022    ALKPHOS 60 01/26/2022    BILITOTAL 1.2 (H) 01/26/2022     OTHER:   Lab Results   Component Value Date    A1C 5.3 01/25/2021    MELISSA 9.5 01/26/2022       Anesthesia Plan    ASA Status:  2   NPO Status:  NPO Appropriate    Anesthesia Type: MAC.     - Reason for MAC: chronic cardiopulmonary disease, straight local not clinically adequate   Induction: Intravenous.           Consents    Anesthesia Plan(s) and associated risks, benefits, and realistic alternatives discussed. Questions answered and patient/representative(s) expressed understanding.    - Discussed:     - Discussed with:  Patient         Postoperative Care            Comments:                ARABELLA LAWSON CRNA

## 2022-03-01 NOTE — ANESTHESIA CARE TRANSFER NOTE
Patient: Stas Harman    Procedure: Procedure(s):  COLONOSCOPY, WITH POLYPECTOMY, WITH RESOLUTION CLIPPING       Diagnosis: Positive colorectal cancer screening using Cologuard test [R19.5]  Diagnosis Additional Information: No value filed.    Anesthesia Type:   MAC     Note:    Oropharynx: oropharynx clear of all foreign objects and spontaneously breathing  Level of Consciousness: drowsy  Oxygen Supplementation: room air    Independent Airway: airway patency satisfactory and stable  Dentition: dentition unchanged  Vital Signs Stable: post-procedure vital signs reviewed and stable  Report to RN Given: handoff report given  Patient transferred to: Phase II    Handoff Report: Identifed the Patient, Identified the Reponsible Provider, Reviewed the pertinent medical history, Discussed the surgical course, Reviewed Intra-OP anesthesia mangement and issues during anesthesia, Set expectations for post-procedure period and Allowed opportunity for questions and acknowledgement of understanding      Vitals:  Vitals Value Taken Time   BP     Temp     Pulse     Resp     SpO2 94 % 03/01/22 1118   Vitals shown include unvalidated device data.    Electronically Signed By: ARABELLA Mendenhall CRNA  March 1, 2022  11:19 AM

## 2022-03-01 NOTE — OR NURSING
Pepito has been discharged to home at 1250 via ambulatory accompanied by Rebekah, wife and RICCO Kent.    Written discharge instructions were provided to both Pepito and Rebekah.  Prescriptions were none.  Patient states their pain is 0/10, and denies any nausea or dizziness upon discharge.    Patient and adult caring for them verbalize understanding of discharge instructions including no driving until tomorrow and no longer taking narcotic pain medications - no operating mechanical equipment and no making any important decisions.They understand reason for discharge, and necessary follow-up appointments.      Yoli Quiroz RN

## 2022-03-02 LAB
PATH REPORT.COMMENTS IMP SPEC: NORMAL
PATH REPORT.FINAL DX SPEC: NORMAL
PATH REPORT.RELEVANT HX SPEC: NORMAL
PHOTO IMAGE: NORMAL

## 2022-03-03 ENCOUNTER — TELEPHONE (OUTPATIENT)
Dept: SURGERY | Facility: OTHER | Age: 67
End: 2022-03-03
Payer: COMMERCIAL

## 2022-11-28 ENCOUNTER — IMMUNIZATION (OUTPATIENT)
Dept: FAMILY MEDICINE | Facility: OTHER | Age: 67
End: 2022-11-28
Attending: INTERNAL MEDICINE
Payer: COMMERCIAL

## 2022-11-28 DIAGNOSIS — Z23 NEED FOR PROPHYLACTIC VACCINATION AND INOCULATION AGAINST INFLUENZA: ICD-10-CM

## 2022-11-28 DIAGNOSIS — Z23 NEED FOR VACCINATION: Primary | ICD-10-CM

## 2022-11-28 PROCEDURE — 91312 COVID-19 VACCINE BIVALENT BOOSTER 12+ (PFIZER): CPT

## 2022-11-28 PROCEDURE — 0124A COVID-19 VACCINE BIVALENT BOOSTER 12+ (PFIZER): CPT

## 2022-11-28 PROCEDURE — G0008 ADMIN INFLUENZA VIRUS VAC: HCPCS

## 2022-11-28 NOTE — PROGRESS NOTES
Immunization Documentation  Verified patient's first and last name, and . Stated reason for visit today is to receive COVID AND FLU vaccines. Accompained to clinic visit with SELF. Denied any concerns with previous immunizations. Allergies reviewed. VIS handout(s) reviewed and given to take home. VACCINES prepared and administered per standing order. Administration documented in IMMUNIZATIONS (see flowsheet and order for further information).  Instructed to wait in lobby for 15 minutes post-injection and notify staff immediately of any reaction.     Poppy Pleitez RN ....................  2022   10:49 AM

## 2023-01-20 DIAGNOSIS — E78.2 MIXED HYPERLIPIDEMIA: ICD-10-CM

## 2023-01-25 NOTE — TELEPHONE ENCOUNTER
[id number] ([facility] [place] sent Rx request for the following:    Patient enrolled in our Rx Med Sync service to improve adherence. We are requesting a refill authorization in advance to ensure an active prescription is on file.     Requested Prescriptions   Pending Prescriptions Disp Refills     atorvastatin (LIPITOR) 20 MG tablet [Pharmacy Med Name: ATORVASTATIN 20MG TABLET] 90 tablet 3     Sig: TAKE 1 TABLET (20 MG) BY MOUTH DAILY   Last Prescription Date:   1/26/22  Last Fill Qty/Refills:         90, R-3    Last Office Visit:              1/26/22   Future Office visit:             Next 5 appointments (look out 90 days)    Feb 01, 2023  9:00 AM  PHYSICAL with Bo Cortes MD  [facility] ([facility] ) [address]  [place] [address]  [phone]        Per LOV note:  Return in about 53 weeks (around 2/1/2023) for Annual Wellness Visit -- Initial (1st visit).    Attempt to reach Pt, to determine if he has enough medication to get through to upcoming appointment. Left message on machine to call back. Stacy Agosto RN .............. 1/25/2023  12:22 PM

## 2023-01-27 RX ORDER — ATORVASTATIN CALCIUM 20 MG/1
20 TABLET, FILM COATED ORAL DAILY
Qty: 90 TABLET | Refills: 0 | Status: SHIPPED | OUTPATIENT
Start: 2023-01-27 | End: 2023-02-01

## 2023-01-27 NOTE — TELEPHONE ENCOUNTER
3rd failed attempt to reach Pt. Routing to PCP, for refill consideration. Stacy Agosto RN .............. 1/27/2023  8:41 AM

## 2023-01-27 NOTE — TELEPHONE ENCOUNTER
In clinical absence of patient's primary, Bo Cortes, patient is requesting that this message be sent to the covering provider for consideration please.    Stacy Agosto RN .............. 1/27/2023  9:59 AM

## 2023-01-30 NOTE — TELEPHONE ENCOUNTER
Patient returned call, he verified his last name and , an he was informed prescription. Stacy Agosto RN .............. 2023  8:27 AM

## 2023-02-01 ENCOUNTER — LAB (OUTPATIENT)
Dept: LAB | Facility: OTHER | Age: 68
End: 2023-02-01
Attending: INTERNAL MEDICINE
Payer: COMMERCIAL

## 2023-02-01 ENCOUNTER — OFFICE VISIT (OUTPATIENT)
Dept: INTERNAL MEDICINE | Facility: OTHER | Age: 68
End: 2023-02-01
Attending: INTERNAL MEDICINE
Payer: COMMERCIAL

## 2023-02-01 VITALS
RESPIRATION RATE: 16 BRPM | SYSTOLIC BLOOD PRESSURE: 128 MMHG | DIASTOLIC BLOOD PRESSURE: 72 MMHG | TEMPERATURE: 98.4 F | HEIGHT: 74 IN | BODY MASS INDEX: 26.98 KG/M2 | WEIGHT: 210.2 LBS | OXYGEN SATURATION: 98 % | HEART RATE: 78 BPM

## 2023-02-01 DIAGNOSIS — Z71.85 VACCINE COUNSELING: ICD-10-CM

## 2023-02-01 DIAGNOSIS — E78.2 MIXED HYPERLIPIDEMIA: ICD-10-CM

## 2023-02-01 DIAGNOSIS — E78.2 MIXED HYPERLIPIDEMIA: Primary | ICD-10-CM

## 2023-02-01 DIAGNOSIS — N18.2 CKD (CHRONIC KIDNEY DISEASE) STAGE 2, GFR 60-89 ML/MIN: ICD-10-CM

## 2023-02-01 DIAGNOSIS — M25.512 CHRONIC LEFT SHOULDER PAIN: ICD-10-CM

## 2023-02-01 DIAGNOSIS — Z12.5 ENCOUNTER FOR SCREENING FOR MALIGNANT NEOPLASM OF PROSTATE: ICD-10-CM

## 2023-02-01 DIAGNOSIS — M25.612 DECREASED RANGE OF MOTION OF LEFT SHOULDER: ICD-10-CM

## 2023-02-01 DIAGNOSIS — Z00.00 ENCOUNTER FOR MEDICARE ANNUAL WELLNESS EXAM: ICD-10-CM

## 2023-02-01 DIAGNOSIS — R74.01 ELEVATED ALT MEASUREMENT: ICD-10-CM

## 2023-02-01 DIAGNOSIS — R73.9 ELEVATED RANDOM BLOOD GLUCOSE LEVEL: ICD-10-CM

## 2023-02-01 DIAGNOSIS — G89.29 CHRONIC LEFT SHOULDER PAIN: ICD-10-CM

## 2023-02-01 DIAGNOSIS — M19.012 ARTHRITIS OF LEFT ACROMIOCLAVICULAR JOINT: ICD-10-CM

## 2023-02-01 DIAGNOSIS — D12.6 TUBULAR ADENOMA OF COLON: ICD-10-CM

## 2023-02-01 DIAGNOSIS — M75.42 IMPINGEMENT SYNDROME OF LEFT SHOULDER: ICD-10-CM

## 2023-02-01 LAB
ALBUMIN SERPL BCG-MCNC: 4 G/DL (ref 3.5–5.2)
ALBUMIN UR-MCNC: NEGATIVE MG/DL
ALP SERPL-CCNC: 81 U/L (ref 40–129)
ALT SERPL W P-5'-P-CCNC: 56 U/L (ref 10–50)
ANION GAP SERPL CALCULATED.3IONS-SCNC: 6 MMOL/L (ref 7–15)
APPEARANCE UR: CLEAR
AST SERPL W P-5'-P-CCNC: 42 U/L (ref 10–50)
BILIRUB SERPL-MCNC: 1.2 MG/DL
BILIRUB UR QL STRIP: NEGATIVE
BUN SERPL-MCNC: 23.8 MG/DL (ref 8–23)
CALCIUM SERPL-MCNC: 9.1 MG/DL (ref 8.8–10.2)
CHLORIDE SERPL-SCNC: 101 MMOL/L (ref 98–107)
CHOLEST SERPL-MCNC: 146 MG/DL
COLOR UR AUTO: NORMAL
CREAT SERPL-MCNC: 1.11 MG/DL (ref 0.67–1.17)
CREAT UR-MCNC: 111.8 MG/DL
DEPRECATED HCO3 PLAS-SCNC: 29 MMOL/L (ref 22–29)
ERYTHROCYTE [DISTWIDTH] IN BLOOD BY AUTOMATED COUNT: 12.3 % (ref 10–15)
GFR SERPL CREATININE-BSD FRML MDRD: 73 ML/MIN/1.73M2
GLUCOSE SERPL-MCNC: 105 MG/DL (ref 70–99)
GLUCOSE UR STRIP-MCNC: NEGATIVE MG/DL
HBA1C MFR BLD: 5.5 % (ref 4–6.2)
HCT VFR BLD AUTO: 47.3 % (ref 40–53)
HDLC SERPL-MCNC: 42 MG/DL
HGB BLD-MCNC: 16.3 G/DL (ref 13.3–17.7)
HGB UR QL STRIP: NEGATIVE
KETONES UR STRIP-MCNC: NEGATIVE MG/DL
LDLC SERPL CALC-MCNC: 85 MG/DL
LEUKOCYTE ESTERASE UR QL STRIP: NEGATIVE
MCH RBC QN AUTO: 29.1 PG (ref 26.5–33)
MCHC RBC AUTO-ENTMCNC: 34.5 G/DL (ref 31.5–36.5)
MCV RBC AUTO: 84 FL (ref 78–100)
MICROALBUMIN UR-MCNC: 61.8 MG/L
MICROALBUMIN/CREAT UR: 55.28 MG/G CR (ref 0–17)
NITRATE UR QL: NEGATIVE
NONHDLC SERPL-MCNC: 104 MG/DL
PH UR STRIP: 5.5 [PH] (ref 5–9)
PLATELET # BLD AUTO: 163 10E3/UL (ref 150–450)
POTASSIUM SERPL-SCNC: 4.7 MMOL/L (ref 3.4–5.3)
PROT SERPL-MCNC: 6.5 G/DL (ref 6.4–8.3)
PSA SERPL-MCNC: 2.34 NG/ML (ref 0–4.5)
RBC # BLD AUTO: 5.61 10E6/UL (ref 4.4–5.9)
SODIUM SERPL-SCNC: 136 MMOL/L (ref 136–145)
SP GR UR STRIP: 1.02 (ref 1–1.03)
TRIGL SERPL-MCNC: 96 MG/DL
UROBILINOGEN UR STRIP-MCNC: NORMAL MG/DL
WBC # BLD AUTO: 5.5 10E3/UL (ref 4–11)

## 2023-02-01 PROCEDURE — 80061 LIPID PANEL: CPT | Mod: ZL

## 2023-02-01 PROCEDURE — 85027 COMPLETE CBC AUTOMATED: CPT | Mod: ZL

## 2023-02-01 PROCEDURE — G0439 PPPS, SUBSEQ VISIT: HCPCS | Performed by: INTERNAL MEDICINE

## 2023-02-01 PROCEDURE — G0463 HOSPITAL OUTPT CLINIC VISIT: HCPCS

## 2023-02-01 PROCEDURE — 81003 URINALYSIS AUTO W/O SCOPE: CPT | Mod: ZL

## 2023-02-01 PROCEDURE — G0103 PSA SCREENING: HCPCS | Mod: ZL

## 2023-02-01 PROCEDURE — 99213 OFFICE O/P EST LOW 20 MIN: CPT | Mod: 25 | Performed by: INTERNAL MEDICINE

## 2023-02-01 PROCEDURE — 83036 HEMOGLOBIN GLYCOSYLATED A1C: CPT | Mod: ZL

## 2023-02-01 PROCEDURE — 36415 COLL VENOUS BLD VENIPUNCTURE: CPT | Mod: ZL

## 2023-02-01 PROCEDURE — 82570 ASSAY OF URINE CREATININE: CPT | Mod: ZL

## 2023-02-01 PROCEDURE — 80053 COMPREHEN METABOLIC PANEL: CPT | Mod: ZL

## 2023-02-01 RX ORDER — ATORVASTATIN CALCIUM 20 MG/1
20 TABLET, FILM COATED ORAL DAILY
Qty: 90 TABLET | Refills: 4 | Status: SHIPPED | OUTPATIENT
Start: 2023-02-01 | End: 2024-02-01

## 2023-02-01 ASSESSMENT — ENCOUNTER SYMPTOMS
HEADACHES: 0
HEMATOCHEZIA: 0
NERVOUS/ANXIOUS: 0
FEVER: 0
BRUISES/BLEEDS EASILY: 0
CHILLS: 0
DIZZINESS: 0
COUGH: 0
ARTHRALGIAS: 1
DYSURIA: 0
SHORTNESS OF BREATH: 0
NAUSEA: 0
JOINT SWELLING: 0
CONSTIPATION: 0
DIARRHEA: 0
SORE THROAT: 0
EYE PAIN: 0
HEARTBURN: 1
FREQUENCY: 0
MYALGIAS: 0
HEMATURIA: 0
PALPITATIONS: 0
ABDOMINAL PAIN: 0
PARESTHESIAS: 0
WEAKNESS: 0

## 2023-02-01 ASSESSMENT — PAIN SCALES - GENERAL: PAINLEVEL: NO PAIN (0)

## 2023-02-01 ASSESSMENT — ACTIVITIES OF DAILY LIVING (ADL): CURRENT_FUNCTION: NO ASSISTANCE NEEDED

## 2023-02-01 NOTE — PROGRESS NOTES
"SUBJECTIVE:   Pepito is a 68 year old who presents for Preventive Visit.    Patient has been advised of split billing requirements and indicates understanding: Yes  Are you in the first 12 months of your Medicare coverage?  No    Healthy Habits:    In general, how would you rate your overall health?  Good    Frequency of exercise:  None    Duration of exercise:  Less than 15 minutes    Do you usually eat at least 4 servings of fruit and vegetables a day, include whole grains    & fiber and avoid regularly eating high fat or \"junk\" foods?  Yes    Taking medications regularly:  Yes    Barriers to taking medications:  Not applicable    Medication side effects:  Not applicable    Ability to successfully perform activities of daily living:  No assistance needed    Home Safety:  Lack of grab bars in the bathroom and throw rugs in the hallway    Hearing Impairment:  No hearing concerns    In the past 6 months, have you been bothered by leaking of urine? Yes    In general, how would you rate your overall mental or emotional health?  Good      PHQ-2 Total Score:    Additional concerns today:  No    Review current opioid prescriptions    For a patient with a current opioid prescription:    Reviewed potential Opioid Use Disorder (OUD) risk factors: Yes     Evaluate their pain severity and current treatment plan:     Provide information on non-opioid treatment options:    Refer to a specialist, as appropriate:    Get more information on pain management in the HHS Pain Management Best Practices Inter-agency Task Force Report    Screen for potential Substance Use Disorders (SUDs)    Reviewed the patient s potential risk factors for SUDs: Yes     Refer to treatment or specialist, as appropriate:     A screening tool isn t required but you may use one:  Find more information in the National [place] on Drug Abuse Screening and Assessment Tools Chart    Have you ever done Advance Care Planning? (For example, a Health Directive, " POLST, or a discussion with a medical provider or your loved ones about your wishes): No, advance care planning information given to patient to review.  Patient plans to discuss their wishes with loved ones or provider.       Fall risk  Fallen 2 or more times in the past year?: Yes  Any fall with injury in the past year?: No    Cognitive Screening   1) Repeat 3 items (Leader, Season, Table)    2) Clock draw: NORMAL  3) 3 item recall: Recalls 1 object   Results: NORMAL clock, 1-2 items recalled: COGNITIVE IMPAIRMENT LESS LIKELY    Mini-CogTM Copyright S Irvin. Licensed by the author for use in [facility]; reprinted with permission (cathy@[facility]). All rights reserved.      Do you have sleep apnea, excessive snoring or daytime drowsiness?: no    Reviewed and updated as needed this visit by clinical staff   Tobacco  Allergies  Meds  Problems  Med Hx  Surg Hx  Fam Hx  Soc   Hx        Reviewed and updated as needed this visit by Provider   Tobacco  Allergies  Meds  Problems  Med Hx  Surg Hx  Fam Hx         Social History     Tobacco Use     Smoking status: Never     Smokeless tobacco: Never   Substance Use Topics     Alcohol use: No     Alcohol Use 2/1/2023   Prescreen: >3 drinks/day or >7 drinks/week? No     Current providers sharing in care for this patient include:   Patient Care Team:  Bo Cortes MD as PCP - General (Internal Medicine)  Bo Cortes MD as Assigned PCP    The following health maintenance items are reviewed in Epic and correct as of today:  Health Maintenance   Topic Date Due     Pneumococcal Vaccine: 65+ Years (2 - PCV) 01/26/2023     MEDICARE ANNUAL WELLNESS VISIT  02/01/2024     BMP  02/01/2024     LIPID  02/01/2024     MICROALBUMIN  02/01/2024     FALL RISK ASSESSMENT  02/01/2024     COLORECTAL CANCER SCREENING  03/01/2027     ADVANCE CARE PLANNING  02/01/2028     DTAP/TDAP/TD IMMUNIZATION (2 - Td or Tdap) 07/06/2031     HEPATITIS C SCREENING  Completed     PHQ-2  "(once per calendar year)  Completed     URINALYSIS  Completed     ZOSTER IMMUNIZATION  Completed     AORTIC ANEURYSM SCREENING (SYSTEM ASSIGNED)  Completed     COVID-19 Vaccine  Completed     IPV IMMUNIZATION  Aged Out     MENINGITIS IMMUNIZATION  Aged Out     INFLUENZA VACCINE  Discontinued     Review of Systems   Constitutional: Negative for chills and fever.   HENT: Negative for congestion, ear pain, hearing loss and sore throat.    Eyes: Negative for pain and visual disturbance.   Respiratory: Negative for cough and shortness of breath.    Cardiovascular: Negative for chest pain, palpitations and peripheral edema.   Gastrointestinal: Positive for heartburn. Negative for abdominal pain, constipation, diarrhea, hematochezia and nausea.   Genitourinary: Negative for dysuria, frequency, genital sores, hematuria, impotence, penile discharge and urgency.   Musculoskeletal: Positive for arthralgias. Negative for joint swelling and myalgias.   Skin: Negative for rash.   Allergic/Immunologic: Negative for immunocompromised state.   Neurological: Negative for dizziness, weakness, headaches and paresthesias.   Hematological: Does not bruise/bleed easily.   Psychiatric/Behavioral: Negative for mood changes. The patient is not nervous/anxious.        OBJECTIVE:   /72 (BP Location: Right arm, Patient Position: Sitting, Cuff Size: Adult Large)   Pulse 78   Temp 98.4  F (36.9  C) (Temporal)   Resp 16   Ht 1.88 m (6' 2\")   Wt 95.3 kg (210 lb 3.2 oz)   SpO2 98%   BMI 26.99 kg/m   Estimated body mass index is 26.99 kg/m  as calculated from the following:    Height as of this encounter: 1.88 m (6' 2\").    Weight as of this encounter: 95.3 kg (210 lb 3.2 oz).  Physical Exam  Constitutional:       General: He is not in acute distress.     Appearance: He is well-developed. He is not diaphoretic.   HENT:      Head: Normocephalic and atraumatic.   Eyes:      General: No scleral icterus.     Conjunctiva/sclera: Conjunctivae " normal.   Neck:      Vascular: No carotid bruit.   Cardiovascular:      Rate and Rhythm: Normal rate and regular rhythm.      Pulses: Normal pulses.   Pulmonary:      Effort: Pulmonary effort is normal.      Breath sounds: Normal breath sounds.   Abdominal:      Palpations: Abdomen is soft.      Tenderness: There is no abdominal tenderness.   Musculoskeletal:         General: No deformity.      Cervical back: Neck supple.      Right lower leg: No edema.      Left lower leg: No edema.      Comments: Problems with abduction/flexion of left shoulder   Lymphadenopathy:      Cervical: No cervical adenopathy.   Skin:     General: Skin is warm and dry.      Findings: No rash.   Neurological:      Mental Status: He is alert and oriented to person, place, and time. Mental status is at baseline.   Psychiatric:         Mood and Affect: Mood normal.         Behavior: Behavior normal.       Diagnostic Test Results:  Labs reviewed in [facility]  Recent Labs   Lab Test 02/01/23  0802 01/26/22  1003 03/16/21  0854 01/25/21  0843   A1C 5.5  --   --  5.3   LDL 85 83 90 60   HDL 42 37 40 37   TRIG 96 129 124 178*   ALT 56* 48 53* 57*   CR 1.11 1.07 1.09 1.21   GFRESTIMATED 73 77 68 60*   POTASSIUM 4.7 4.5 4.3 4.4   WBC 5.5  --   --  7.5   HGB 16.3  --   --  17.4     --   --  194   ALBUMIN 4.0 4.0 4.2 4.2   Hemoglobin A1c is normal.  LDL, HDL and triglycerides are at goal.  ALT is elevated.  Creatinine is at baseline.  Potassium normal.  CBC normal.  Albumin normal.    ASSESSMENT / PLAN:       ICD-10-CM    1. Mixed hyperlipidemia  E78.2 atorvastatin (LIPITOR) 20 MG tablet      2. CKD (chronic kidney disease) stage 2, GFR 60-89 ml/min  N18.2       3. Elevated ALT measurement  R74.01       4. Impingement syndrome of left shoulder  M75.42       5. Decreased range of motion of left shoulder  M25.612       6. Arthritis of left acromioclavicular joint  M19.012       7. Chronic left shoulder pain  M25.512     G89.29       8. Tubular adenoma  "of colon - January 2022 - Colonoscopy Recheck in 2027  D12.6       9. Vaccine counseling  Z71.85       10. Encounter for Medicare annual wellness exam  Z00.00       Patient presents for Medicare annual wellness visit as well as follow-up multiple issues.    Mixed hyperlipidemia.  Cholesterol levels are all at goal.  Doing well with current medication regimen.  Continues with Lipitor 20 mg daily.  No side effects reported.  Continue current dosing.  Refill sent to pharmacy.    Stage II chronic kidney disease.  Kidney function is currently at baseline.  Encouraged NSAID avoidance.    Elevated ALT levels.  Ongoing.  Appears stable.  Possibly due to Lipitor.  Continue to monitor.    Left shoulder pain.  Has arthritis, impingement syndrome, left acromioclavicular joint arthritis.  Overall pain seems to be doing reasonably well.  He continues with regular exercises at home.    Recent abnormal Cologuard testing.  Came back showing small tubular adenoma of the colon.  Recommend rechecking in 5 years, chart updated.    Vaccine counseling completed.  Encouraged annual vaccinations.  Consider pneumococcal vaccine anytime.  Declines at this time.    Patient has been advised of split billing requirements and indicates understanding: Yes      COUNSELING:  Reviewed preventive health counseling, as reflected in patient instructions  Special attention given to:       Consider AAA screening for ages 65-75 and smoking history       Regular exercise       Healthy diet/nutrition       Vision screening       Hearing screening       Dental care       Bladder control       Fall risk prevention       Immunizations    BMI:   Estimated body mass index is 26.99 kg/m  as calculated from the following:    Height as of this encounter: 1.88 m (6' 2\").    Weight as of this encounter: 95.3 kg (210 lb 3.2 oz).         He reports that he has never smoked. He has never used smokeless tobacco.      Appropriate preventive services were discussed with " this patient, including applicable screening as appropriate for cardiovascular disease, diabetes, osteopenia/osteoporosis, and glaucoma.  As appropriate for age/gender, discussed screening for colorectal cancer, prostate cancer, breast cancer, and cervical cancer. Checklist reviewing preventive services available has been given to the patient.    Reviewed patients plan of care and provided an AVS. The Complex Care Plan (for patients with higher acuity and needing more deliberate coordination of services) for Stas meets the Care Plan requirement. This Care Plan has been established and reviewed with the Patient.          Bo Cortes MD  [facility]    Identified Health Risks:    He is at risk for lack of exercise and has been provided with information to increase physical activity for the benefit of his well-being.  Information on urinary incontinence and treatment options given to patient.  He is at risk for falling and has been provided with information to reduce the risk of falling at home.

## 2023-02-01 NOTE — NURSING NOTE
"Chief Complaint   Patient presents with     Medicare Wellness Visit         Initial /72 (BP Location: Right arm, Patient Position: Sitting, Cuff Size: Adult Large)   Pulse 78   Temp 98.4  F (36.9  C) (Temporal)   Resp 16   Ht 1.88 m (6' 2\")   Wt 95.3 kg (210 lb 3.2 oz)   SpO2 98%   BMI 26.99 kg/m   Estimated body mass index is 26.99 kg/m  as calculated from the following:    Height as of this encounter: 1.88 m (6' 2\").    Weight as of this encounter: 95.3 kg (210 lb 3.2 oz).       FOOD SECURITY SCREENING QUESTIONS:    The next two questions are to help us understand your food security.  If you are feeling you need any assistance in this area, we have resources available to support you today.    Hunger Vital Signs:  Within the past 12 months we worried whether our food would run out before we got money to buy more. Never  Within the past 12 months the food we bought just didn't last and we didn't have money to get more. Never    Advance Care Directive on file? no      Medication reconciliation complete.      Antonio Schrader,on 2/1/2023 at 8:42 AM        "

## 2023-02-01 NOTE — PATIENT INSTRUCTIONS
Blood pressure is well controlled.     PSA - prostate cancer lab - is normal.     A1c is normal.      -- Pre-diabetes:    fasting glucose: 100 - 125    hemoglobin A1c: 5.7 - 6.4   -- Diabetes:    fasting glucose greater than 125    random glucose greater than 200    hemoglobin A1c: > or = 6.5     Medications refilled.   Labs are stable.     Results for orders placed or performed in visit on 02/01/23   PSA Screen GH     Status: Normal   Result Value Ref Range    Prostate Specific Antigen Screen 2.34 0.00 - 4.50 ng/mL    Narrative    This result is obtained using the Roche Elecsys total PSA method on the janey e601 immunoassay analyzer. Results obtained with different assay methods or kits cannot be used interchangeably.   Comprehensive metabolic panel     Status: Abnormal   Result Value Ref Range    Sodium 136 136 - 145 mmol/L    Potassium 4.7 3.4 - 5.3 mmol/L    Chloride 101 98 - 107 mmol/L    Carbon Dioxide (CO2) 29 22 - 29 mmol/L    Anion Gap 6 (L) 7 - 15 mmol/L    Urea Nitrogen 23.8 (H) 8.0 - 23.0 mg/dL    Creatinine 1.11 0.67 - 1.17 mg/dL    Calcium 9.1 8.8 - 10.2 mg/dL    Glucose 105 (H) 70 - 99 mg/dL    Alkaline Phosphatase 81 40 - 129 U/L    AST 42 10 - 50 U/L    ALT 56 (H) 10 - 50 U/L    Protein Total 6.5 6.4 - 8.3 g/dL    Albumin 4.0 3.5 - 5.2 g/dL    Bilirubin Total 1.2 <=1.2 mg/dL    GFR Estimate 73 >60 mL/min/1.73m2   Lipid Profile     Status: Normal   Result Value Ref Range    Cholesterol 146 <200 mg/dL    Triglycerides 96 <150 mg/dL    Direct Measure HDL 42 >=40 mg/dL    LDL Cholesterol Calculated 85 <=100 mg/dL    Non HDL Cholesterol 104 <130 mg/dL    Narrative    Cholesterol  Desirable:  <200 mg/dL    Triglycerides  Normal:  Less than 150 mg/dL  Borderline High:  150-199 mg/dL  High:  200-499 mg/dL  Very High:  Greater than or equal to 500 mg/dL    Direct Measure HDL  Female:  Greater than or equal to 50 mg/dL   Male:  Greater than or equal to 40 mg/dL    LDL Cholesterol  Desirable:  <100mg/dL  Above  Desirable:  100-129 mg/dL   Borderline High:  130-159 mg/dL   High:  160-189 mg/dL   Very High:  >= 190 mg/dL    Non HDL Cholesterol  Desirable:  130 mg/dL  Above Desirable:  130-159 mg/dL  Borderline High:  160-189 mg/dL  High:  190-219 mg/dL  Very High:  Greater than or equal to 220 mg/dL   CBC with platelets     Status: Normal   Result Value Ref Range    WBC Count 5.5 4.0 - 11.0 10e3/uL    RBC Count 5.61 4.40 - 5.90 10e6/uL    Hemoglobin 16.3 13.3 - 17.7 g/dL    Hematocrit 47.3 40.0 - 53.0 %    MCV 84 78 - 100 fL    MCH 29.1 26.5 - 33.0 pg    MCHC 34.5 31.5 - 36.5 g/dL    RDW 12.3 10.0 - 15.0 %    Platelet Count 163 150 - 450 10e3/uL   Hemoglobin A1c     Status: Normal   Result Value Ref Range    Hemoglobin A1C 5.5 4.0 - 6.2 %   UA reflex to Microscopic     Status: Normal   Result Value Ref Range    Color Urine Light Yellow Colorless, Straw, Light Yellow, Yellow    Appearance Urine Clear Clear    Glucose Urine Negative Negative mg/dL    Bilirubin Urine Negative Negative    Ketones Urine Negative Negative mg/dL    Specific Gravity Urine 1.020 1.000 - 1.030    Blood Urine Negative Negative    pH Urine 5.5 5.0 - 9.0    Protein Albumin Urine Negative Negative mg/dL    Urobilinogen Urine Normal Normal, 2.0 mg/dL    Nitrite Urine Negative Negative    Leukocyte Esterase Urine Negative Negative    Narrative    Microscopic not indicated        Return in approximately 1 year, or sooner as needed for follow-up with Dr. Cortes.  - Annual Follow-up / Physical - Medicare Annual Wellness Visit     Clinic : [phone]  Appointment line: [phone]       Patient Education   Personalized Prevention Plan  You are due for the preventive services outlined below.  Your care team is available to assist you in scheduling these services.  If you have already completed any of these items, please share that information with your care team to update in your medical record.  Health Maintenance Due   Topic Date Due     Kidney  Microalbumin Urine Test  01/25/2022     Pneumococcal Vaccine (2 - PCV) 01/26/2023     Preventive Health Recommendations  See your health care provider every year to    Review health changes.     Discuss preventive care.      Review your medicines if your doctor has prescribed any.    Talk with your health care provider about whether you should have a test to screen for prostate cancer (PSA).    Every 3 years, have a diabetes test (fasting glucose). If you are at risk for diabetes, you should have this test more often.    Every 5 years, have a cholesterol test. Have this test more often if you are at risk for high cholesterol or heart disease.     Every 10 years, have a colonoscopy. Or, have a yearly FIT test (stool test). These exams will check for colon cancer.    Talk to with your health care provider about screening for Abdominal Aortic Aneurysm if you have a family history of AAA or have a history of smoking.    Shots:     Get a flu shot each year.     Get a tetanus shot every 10 years.     Talk to your doctor about your pneumonia vaccines. There are now two you should receive - Pneumovax (PPSV 23) and Prevnar (PCV 13).    Talk to your pharmacist about a shingles vaccine.     Talk to your doctor about the hepatitis B vaccine.    Nutrition:     Eat at least 5 servings of fruits and vegetables each day.     Eat whole-grain bread, whole-wheat pasta and brown rice instead of white grains and rice.     Get adequate Calcium and Vitamin D.     Lifestyle    Exercise for at least 150 minutes a week (30 minutes a day, 5 days a week). This will help you control your weight and prevent disease.     Limit alcohol to one drink per day.     No smoking.     Wear sunscreen to prevent skin cancer.     See your dentist every six months for an exam and cleaning.     See your eye doctor every 1 to 2 years to screen for conditions such as glaucoma, macular degeneration and cataracts.    Personalized Prevention Plan  You are due for  the preventive services outlined below.  Your care team is available to assist you in scheduling these services.  If you have already completed any of these items, please share that information with your care team to update in your medical record.  Health Maintenance   Topic Date Due     MICROALBUMIN  01/25/2022     Pneumococcal Vaccine: 65+ Years (2 - PCV) 01/26/2023     MEDICARE ANNUAL WELLNESS VISIT  02/01/2024     BMP  02/01/2024     LIPID  02/01/2024     FALL RISK ASSESSMENT  02/01/2024     COLORECTAL CANCER SCREENING  03/01/2027     ADVANCE CARE PLANNING  02/01/2028     DTAP/TDAP/TD IMMUNIZATION (2 - Td or Tdap) 07/06/2031     HEPATITIS C SCREENING  Completed     PHQ-2 (once per calendar year)  Completed     URINALYSIS  Completed     ZOSTER IMMUNIZATION  Completed     AORTIC ANEURYSM SCREENING (SYSTEM ASSIGNED)  Completed     COVID-19 Vaccine  Completed     IPV IMMUNIZATION  Aged Out     MENINGITIS IMMUNIZATION  Aged Out     INFLUENZA VACCINE  Discontinued       Exercise for a Healthier Heart  You may wonder how you can improve the health of your heart. If you re thinking about exercise, you re on the right track. You don t need to become an athlete. But you do need a certain amount of brisk exercise to help strengthen your heart. If you have been diagnosed with a heart condition, your healthcare provider may advise exercise to help stabilize your condition. To help make exercise a habit, choose safe, fun activities.      Exercise with a friend. When activity is fun, you're more likely to stick with it.   Before you start  Check with your healthcare provider before starting an exercise program. This is especially important if you have not been active for a while. It's also important if you have a long-term (chronic) health problem such as heart disease, diabetes, or obesity. Or if you are at high risk for having these problems.   Why exercise?  Exercising regularly offers many healthy rewards. It can help you  do all of the following:     Improve your blood cholesterol level to help prevent further heart trouble    Lower your blood pressure to help prevent a stroke or heart attack    Control diabetes, or reduce your risk of getting this disease    Improve your heart and lung function    Reach and stay at a healthy weight    Make your muscles stronger so you can stay active    Prevent falls and fractures by slowing the loss of bone mass (osteoporosis)    Manage stress better    Reduce your blood pressure    Improve your sense of self and your body image  Exercise tips      Ease into your routine. Set small goals. Then build on them. If you are not sure what your activity level should be, talk with your healthcare provider first before starting an exercise routine.    Exercise on most days. Aim for a total of 150 minutes (2 hours and 30 minutes) or more of moderate-intensity aerobic activity each week. Or 75 minutes (1 hour and 15 minutes) or more of vigorous-intensity aerobic activity each week. Or try for a combination of both. Moderate activity means that you breathe heavier and your heart rate increases but you can still talk. Think about doing 40 minutes of moderate exercise, 3 to 4 times a week. For best results, activity should last for about 40 minutes to lower blood pressure and cholesterol. It's OK to work up to the 40-minute period over time. Examples of moderate-intensity activity are walking 1 mile in 15 minutes. Or doing 30 to 45 minutes of yard work.    Step up your daily activity level.  Along with your exercise program, try being more active the whole day. Walk instead of drive. Or park further away so that you take more steps each day. Do more household tasks or yard work. You may not be able to meet the advised mount of physical activity. But doing some moderate- or vigorous-intensity aerobic activity can help reduce your risk for heart disease. Your healthcare provider can help you figure out what is  best for you.    Choose 1 or more activities you enjoy.  Walking is one of the easiest things you can do. You can also try swimming, riding a bike, dancing, or taking an exercise class.    When to call your healthcare provider  Call your healthcare provider if you have any of these:     Chest pain or feel dizzy or lightheaded    Burning, tightness, pressure, or heaviness in your chest, neck, shoulders, back, or arms    Abnormal shortness of breath    More joint or muscle pain    A very fast or irregular heartbeat (palpitations)  [employer] last reviewed this educational content on 7/1/2019 2000-2021 [employer]. All rights reserved. This information is not intended as a substitute for professional medical care. Always follow your healthcare professional's instructions.          Urinary Incontinence (Male)    Urinary incontinence means not being able to control the release of urine from the bladder.   Causes  Common causes of urinary incontinence in men include:    Infection    Certain medicines    Aging    Poor pelvic muscle tone    Bladder spasms    Obesity    Trouble urinating and fully emptying the bladder (urinary retention)  Other things that can cause incontinence are:     Nervous system diseases    Diabetes    Sleep apnea    Urinary tract infections    Prostate surgery    Pelvic injury  Constipation and smoking have also been identified as risk factors.   Symptoms    Urge incontinence (overactive bladder). This is a sudden urge to urinate. It occurs even though there may not be much urine in the bladder. The need to urinate often during the night is common. It's due to bladder spasms.    Stress incontinence. This is urine leakage that you can't control. It can occur with sneezing, coughing, and other actions that put stress on the bladder.    Treatment  Treatment depends on what is causing the condition. Bladder infections are treated with antibiotics. Urinary retention is treated with a  bladder catheter.   Home care  Follow these guidelines when caring for yourself at home:    Don't have any foods and drinks that may irritate the bladder. This includes:  ? Chocolate  ? Alcohol  ? Caffeine  ? Carbonated drinks  ? Acidic fruits and juices    Limit fluids to 6 to 8 cups a day.    Lose weight if you are overweight. This will reduce your symptoms.    If advised, do regular pelvic muscle-strengthening exercises such as Kegel exercises.    If needed, wear absorbent pads to catch urine. Change the pads often. This is for good hygiene and to prevent skin and bladder infections.    Bathe daily for good hygiene.    If an antibiotic was prescribed to treat a bladder infection, take it until it's finished. Keep taking it even if you are feeling better. This is to make sure your infection has cleared.    If a catheter was left in place, keep bacteria from getting into the collection bag. Don't disconnect the catheter from the collection bag.    Use a leg band to secure the catheter drainage tube, so it does not pull on the catheter. Drain the collection bag when it becomes full. To do this, use the drain spout at the bottom of the bag. Don't disconnect the bag from the catheter.    Don't pull on or try to remove a catheter. The catheter must be removed by a healthcare provider.    If you smoke, stop. Ask your provider for help if you can't do this on your own.  Follow-up care  Follow up with your healthcare provider, or as advised.  When to get medical advice  Call your healthcare provider right away if any of these occur:    Fever over 100.4 F (38 C), or as directed by your provider    Bladder pain or fullness    Belly swelling, nausea, or vomiting    Back pain    Weakness, dizziness, or fainting    If a catheter was left in place, return if:  ? The catheter falls out  ? The catheter stops draining for 6 hours  ? Your urine gets cloudy or smells bad  [employer] last reviewed this educational content on  1/1/2020 2000-2021 [employer]. All rights reserved. This information is not intended as a substitute for professional medical care. Always follow your healthcare professional's instructions.          Preventing Falls at Home  A person can fall for many reasons. Older adults may fall because reaction time slows down as we age. Your muscles and joints may get stiff, weak, or less flexible because of illness, medicines, or a physical condition.   Other health problems that make falls more likely include:     Arthritis    Dizziness or lightheadedness when you stand up (orthostatic hypotension)    History of a stroke    Dizziness    Anemia    Certain medicines taken for mental illness or to control blood pressure.    Problems with balance or gait    Bladder or urinary problems    History of falling    Changes in vision (vision impairment)    Changes in thinking skills and memory (cognitive impairment)  Injuries from a fall can include serious injuries such as broken bones, dislocated joints, internal bleeding and cuts. Injuries like these can limit your independence.   Prevention tips  To help prevent falls and fall-related injuries, follow the tips below.    Floors  To make floors safer:     Put nonskid pads under area rugs.    Remove small rugs.    Replace worn floor coverings.    Tack carpets firmly to each step on carpeted stairs. Put nonskid strips on the edges of uncarpeted stairs.    Keep floors and stairs free of clutter and cords.    Arrange furniture so there are clear pathways.    Clean up any spills right away.  Bathrooms    To make bathrooms safer:     Install grab bars in the tub or shower.    Apply nonskid strips or put a nonskid rubber mat in the tub or shower.    Sit on a bath chair to bathe.    Use bathmats with nonskid backing.  Lighting  To improve visibility in your home:      Keep a flashlight in each room. Or put a lamp next to the bed within easy reach.    Put nightlights in the  bedrooms, hallways, kitchen, and bathrooms.    Make sure all stairways have good lighting.    Take your time when going up and down stairs.    Put handrails on both sides of stairs and in walkways for more support. To prevent injury to your wrist or arm, don t use handrails to pull yourself up.    Install grab bars to pull yourself up.    Move or rearrange items that you use often. This will make them easier to find or reach.    Look at your home to find any safety hazards. Especially look at doorways, walkways, and the driveway. Remove or repair any safety problems that you find.  Other changes to make    Look around to find any safety hazards. Look closely at doorways, walkways, and the driveway. Remove or repair any safety problems that you find.    Wear shoes that fit well.    Take your time when going up and down stairs.    Put handrails on both sides of stairs and in walkways for more support. To prevent injury to your wrist or arm, don t use handrails to pull yourself up.    Install grab bars wherever needed to pull yourself up.    Arrange items that you use often. This will make them easier to find or reach.    [employer] last reviewed this educational content on 3/1/2020    1535-5411 [employer]. All rights reserved. This information is not intended as a substitute for professional medical care. Always follow your healthcare professional's instructions.

## 2023-08-15 ENCOUNTER — TRANSFERRED RECORDS (OUTPATIENT)
Dept: HEALTH INFORMATION MANAGEMENT | Facility: OTHER | Age: 68
End: 2023-08-15
Payer: COMMERCIAL

## 2023-09-22 ENCOUNTER — TRANSFERRED RECORDS (OUTPATIENT)
Dept: HEALTH INFORMATION MANAGEMENT | Facility: CLINIC | Age: 68
End: 2023-09-22
Payer: COMMERCIAL

## 2023-12-28 ENCOUNTER — PATIENT OUTREACH (OUTPATIENT)
Dept: GASTROENTEROLOGY | Facility: CLINIC | Age: 68
End: 2023-12-28
Payer: COMMERCIAL

## 2024-02-02 ENCOUNTER — LAB (OUTPATIENT)
Dept: LAB | Facility: OTHER | Age: 69
End: 2024-02-02
Attending: INTERNAL MEDICINE
Payer: COMMERCIAL

## 2024-02-02 ENCOUNTER — OFFICE VISIT (OUTPATIENT)
Dept: INTERNAL MEDICINE | Facility: OTHER | Age: 69
End: 2024-02-02
Attending: NURSE PRACTITIONER
Payer: COMMERCIAL

## 2024-02-02 VITALS
BODY MASS INDEX: 28.33 KG/M2 | WEIGHT: 209.2 LBS | TEMPERATURE: 97.3 F | SYSTOLIC BLOOD PRESSURE: 128 MMHG | HEIGHT: 72 IN | OXYGEN SATURATION: 98 % | RESPIRATION RATE: 20 BRPM | DIASTOLIC BLOOD PRESSURE: 68 MMHG | HEART RATE: 70 BPM

## 2024-02-02 DIAGNOSIS — S46.211D BICEPS RUPTURE, PROXIMAL, RIGHT, SUBSEQUENT ENCOUNTER: ICD-10-CM

## 2024-02-02 DIAGNOSIS — Z00.00 MEDICARE ANNUAL WELLNESS VISIT, SUBSEQUENT: ICD-10-CM

## 2024-02-02 DIAGNOSIS — K42.9 UMBILICAL HERNIA WITHOUT OBSTRUCTION AND WITHOUT GANGRENE: ICD-10-CM

## 2024-02-02 DIAGNOSIS — Z98.890 S/P SHOULDER SURGERY: ICD-10-CM

## 2024-02-02 DIAGNOSIS — Z12.5 ENCOUNTER FOR SCREENING FOR MALIGNANT NEOPLASM OF PROSTATE: ICD-10-CM

## 2024-02-02 DIAGNOSIS — E78.2 MIXED HYPERLIPIDEMIA: ICD-10-CM

## 2024-02-02 DIAGNOSIS — N18.2 CKD (CHRONIC KIDNEY DISEASE) STAGE 2, GFR 60-89 ML/MIN: Primary | ICD-10-CM

## 2024-02-02 DIAGNOSIS — D12.6 TUBULAR ADENOMA OF COLON: ICD-10-CM

## 2024-02-02 DIAGNOSIS — Z71.85 VACCINE COUNSELING: ICD-10-CM

## 2024-02-02 DIAGNOSIS — R73.9 ELEVATED RANDOM BLOOD GLUCOSE LEVEL: ICD-10-CM

## 2024-02-02 DIAGNOSIS — N18.2 CKD (CHRONIC KIDNEY DISEASE) STAGE 2, GFR 60-89 ML/MIN: ICD-10-CM

## 2024-02-02 DIAGNOSIS — K40.90 INGUINAL HERNIA OF LEFT SIDE WITHOUT OBSTRUCTION OR GANGRENE: ICD-10-CM

## 2024-02-02 PROBLEM — R74.01 ELEVATED ALT MEASUREMENT: Status: RESOLVED | Noted: 2022-01-26 | Resolved: 2024-02-02

## 2024-02-02 LAB
ALBUMIN SERPL BCG-MCNC: 4.1 G/DL (ref 3.5–5.2)
ALBUMIN UR-MCNC: NEGATIVE MG/DL
ALP SERPL-CCNC: 74 U/L (ref 40–150)
ALT SERPL W P-5'-P-CCNC: 48 U/L (ref 0–70)
ANION GAP SERPL CALCULATED.3IONS-SCNC: 7 MMOL/L (ref 7–15)
APPEARANCE UR: CLEAR
AST SERPL W P-5'-P-CCNC: 42 U/L (ref 0–45)
BILIRUB SERPL-MCNC: 1.3 MG/DL
BILIRUB UR QL STRIP: NEGATIVE
BUN SERPL-MCNC: 24.5 MG/DL (ref 8–23)
CALCIUM SERPL-MCNC: 9.2 MG/DL (ref 8.8–10.2)
CHLORIDE SERPL-SCNC: 103 MMOL/L (ref 98–107)
CHOLEST SERPL-MCNC: 152 MG/DL
COLOR UR AUTO: NORMAL
CREAT SERPL-MCNC: 1.14 MG/DL (ref 0.67–1.17)
CREAT UR-MCNC: 109.8 MG/DL
DEPRECATED HCO3 PLAS-SCNC: 29 MMOL/L (ref 22–29)
EGFRCR SERPLBLD CKD-EPI 2021: 70 ML/MIN/1.73M2
ERYTHROCYTE [DISTWIDTH] IN BLOOD BY AUTOMATED COUNT: 12.4 % (ref 10–15)
FASTING STATUS PATIENT QL REPORTED: YES
GLUCOSE SERPL-MCNC: 108 MG/DL (ref 70–99)
GLUCOSE UR STRIP-MCNC: NEGATIVE MG/DL
HBA1C MFR BLD: 5.2 % (ref 4–6.2)
HCT VFR BLD AUTO: 46 % (ref 40–53)
HDLC SERPL-MCNC: 43 MG/DL
HGB BLD-MCNC: 16.4 G/DL (ref 13.3–17.7)
HGB UR QL STRIP: NEGATIVE
KETONES UR STRIP-MCNC: NEGATIVE MG/DL
LDLC SERPL CALC-MCNC: 89 MG/DL
LEUKOCYTE ESTERASE UR QL STRIP: NEGATIVE
MCH RBC QN AUTO: 29.7 PG (ref 26.5–33)
MCHC RBC AUTO-ENTMCNC: 35.7 G/DL (ref 31.5–36.5)
MCV RBC AUTO: 83 FL (ref 78–100)
MICROALBUMIN UR-MCNC: 54.5 MG/L
MICROALBUMIN/CREAT UR: 49.64 MG/G CR (ref 0–17)
NITRATE UR QL: NEGATIVE
NONHDLC SERPL-MCNC: 109 MG/DL
PH UR STRIP: 5.5 [PH] (ref 5–9)
PLATELET # BLD AUTO: 167 10E3/UL (ref 150–450)
POTASSIUM SERPL-SCNC: 4.7 MMOL/L (ref 3.4–5.3)
PROT SERPL-MCNC: 6.8 G/DL (ref 6.4–8.3)
PSA SERPL DL<=0.01 NG/ML-MCNC: 2.19 NG/ML (ref 0–4.5)
RBC # BLD AUTO: 5.52 10E6/UL (ref 4.4–5.9)
SODIUM SERPL-SCNC: 139 MMOL/L (ref 135–145)
SP GR UR STRIP: 1.02 (ref 1–1.03)
TRIGL SERPL-MCNC: 101 MG/DL
UROBILINOGEN UR STRIP-MCNC: NORMAL MG/DL
WBC # BLD AUTO: 5.3 10E3/UL (ref 4–11)

## 2024-02-02 PROCEDURE — 82043 UR ALBUMIN QUANTITATIVE: CPT | Mod: ZL

## 2024-02-02 PROCEDURE — 80053 COMPREHEN METABOLIC PANEL: CPT | Mod: ZL

## 2024-02-02 PROCEDURE — 80061 LIPID PANEL: CPT | Mod: ZL

## 2024-02-02 PROCEDURE — 36415 COLL VENOUS BLD VENIPUNCTURE: CPT | Mod: ZL

## 2024-02-02 PROCEDURE — 81003 URINALYSIS AUTO W/O SCOPE: CPT | Mod: ZL

## 2024-02-02 PROCEDURE — 83036 HEMOGLOBIN GLYCOSYLATED A1C: CPT | Mod: ZL

## 2024-02-02 PROCEDURE — G0463 HOSPITAL OUTPT CLINIC VISIT: HCPCS

## 2024-02-02 PROCEDURE — 99214 OFFICE O/P EST MOD 30 MIN: CPT | Mod: 25 | Performed by: INTERNAL MEDICINE

## 2024-02-02 PROCEDURE — 85027 COMPLETE CBC AUTOMATED: CPT | Mod: ZL

## 2024-02-02 PROCEDURE — G0439 PPPS, SUBSEQ VISIT: HCPCS | Performed by: INTERNAL MEDICINE

## 2024-02-02 PROCEDURE — G0103 PSA SCREENING: HCPCS | Mod: ZL

## 2024-02-02 RX ORDER — ATORVASTATIN CALCIUM 20 MG/1
20 TABLET, FILM COATED ORAL DAILY
Qty: 90 TABLET | Refills: 4 | Status: SHIPPED | OUTPATIENT
Start: 2024-02-02 | End: 2024-08-07

## 2024-02-02 RX ORDER — ASPIRIN 81 MG/1
81 TABLET ORAL DAILY
COMMUNITY

## 2024-02-02 SDOH — HEALTH STABILITY: PHYSICAL HEALTH: ON AVERAGE, HOW MANY DAYS PER WEEK DO YOU ENGAGE IN MODERATE TO STRENUOUS EXERCISE (LIKE A BRISK WALK)?: 5 DAYS

## 2024-02-02 ASSESSMENT — ENCOUNTER SYMPTOMS
MYALGIAS: 0
DIZZINESS: 0
PALPITATIONS: 0
ARTHRALGIAS: 1
ABDOMINAL PAIN: 0
FREQUENCY: 0
JOINT SWELLING: 0
BRUISES/BLEEDS EASILY: 0
COUGH: 0
EYE PAIN: 0
HEMATURIA: 0
SHORTNESS OF BREATH: 0
SORE THROAT: 0
NAUSEA: 0
DYSURIA: 0
DIARRHEA: 0
NERVOUS/ANXIOUS: 0
CHILLS: 0
FEVER: 0
PARESTHESIAS: 0
ROS GI COMMENTS: LEFT INGUINAL HERNIA
HEADACHES: 0
WEAKNESS: 0
HEARTBURN: 1
HEMATOCHEZIA: 0
CONSTIPATION: 0

## 2024-02-02 ASSESSMENT — SOCIAL DETERMINANTS OF HEALTH (SDOH): HOW OFTEN DO YOU GET TOGETHER WITH FRIENDS OR RELATIVES?: ONCE A WEEK

## 2024-02-02 ASSESSMENT — PAIN SCALES - GENERAL: PAINLEVEL: MILD PAIN (2)

## 2024-02-02 ASSESSMENT — ACTIVITIES OF DAILY LIVING (ADL): CURRENT_FUNCTION: NO ASSISTANCE NEEDED

## 2024-02-02 NOTE — PATIENT INSTRUCTIONS
Blood pressure is well controlled.     Medications refilled.   Labs are stable.     PSA is pending.     If you would like to get consultation from surgery clinic about your hernias, call and schedule appointment anytime.  Appointment line: [phone]        Return in approximately 1 year, or sooner as needed for follow-up with Dr. Cortes.  - Annual Follow-up / Physical - Medicare Annual Wellness Visit     Clinic : [phone]  Appointment line: [phone]

## 2024-02-02 NOTE — NURSING NOTE
"No chief complaint on file.      Initial /68 (BP Location: Right arm, Patient Position: Sitting, Cuff Size: Adult Regular)   Pulse 70   Temp 97.3  F (36.3  C) (Temporal)   Resp 20   Ht 1.816 m (5' 11.5\")   Wt 94.9 kg (209 lb 3.2 oz)   SpO2 98%   BMI 28.77 kg/m   Estimated body mass index is 28.77 kg/m  as calculated from the following:    Height as of this encounter: 1.816 m (5' 11.5\").    Weight as of this encounter: 94.9 kg (209 lb 3.2 oz).  Medication Review: complete    The next two questions are to help us understand your food security.  If you are feeling you need any assistance in this area, we have resources available to support you today.          2/2/2024   SDOH- Food Insecurity   Within the past 12 months, did you worry that your food would run out before you got money to buy more? N   Within the past 12 months, did the food you bought just not last and you didn t have money to get more? N         Health Care Directive:  Patient does not have a Health Care Directive or Living Will: Discussed advance care planning with patient; however, patient declined at this time.    Antonio Juarez      "

## 2024-02-02 NOTE — PROGRESS NOTES
Assessment & Plan   Problem List Items Addressed This Visit          Digestive    Tubular adenoma of colon - January 2022 - Colonoscopy Recheck in 2027       Endocrine    Mixed hyperlipidemia    Relevant Medications    atorvastatin (LIPITOR) 20 MG tablet       Musculoskeletal and Integumentary    Biceps rupture, proximal, right, subsequent encounter    Relevant Medications    aspirin 81 MG EC tablet       Urinary    CKD (chronic kidney disease) stage 2, GFR 60-89 ml/min - Primary       Other    S/P shoulder surgery - Right    Umbilical hernia without obstruction and without gangrene    Inguinal hernia of left side without obstruction or gangrene     Other Visit Diagnoses       Encounter for screening for malignant neoplasm of prostate        Relevant Orders    Prostate Specific Antigen Screen (Completed)    Vaccine counseling        Medicare annual wellness visit, subsequent               Patient presents for Medicare annual wellness visit as well as follow-up multiple issues.    Patient had tubular adenoma of the colon in January and is due for recheck in 3 years.  No bowel changes or other recent stool changes.    Prostate lab cancer screening, check PSA levels.    History of right shoulder biceps rupture, appears stable.  No recent issues.    Abdominal hernia as well as left inguinal hernia, no obvious obstruction or gangrene.  Recommend follow-up in surgery clinic as needed for consultation.    MIXED HYPERLIPIDEMIA.  Ongoing. LDL is at goal: Yes. Triglycerides are at goal: Yes.    Medication side effects reported: None.   Continue current medications for now. Medication list reviewed/updated. Refills completed as needed.  Recent Labs   Lab Test 02/02/24  0805 02/01/23  0802   CHOL 152 146   HDL 43 42   LDL 89 85   TRIG 101 96        Chronic Kidney Disease, Stage 2 (GFR 60-89), chronic, ongoing.  Kidney function had been slowly declining.  Encourage NSAID avoidance.       Vaccine counseling completed.  Encourage  "routine / annual vaccinations.        BMI  Estimated body mass index is 28.77 kg/m  as calculated from the following:    Height as of this encounter: 1.816 m (5' 11.5\").    Weight as of this encounter: 94.9 kg (209 lb 3.2 oz).       Counseling  Appropriate preventive services were discussed with this patient, including applicable screening as appropriate for fall prevention, nutrition, physical activity, Tobacco-use cessation, weight loss and cognition.  Checklist reviewing preventive services available has been given to the patient.  Reviewed patient's diet, addressing concerns and/or questions.   The patient was provided with written information regarding signs of hearing loss.     Patient has been advised of split billing requirements and indicates understanding: Yes      Return in about 1 year (around 2/2/2025) for Annual Medicare Wellness Visit.      Bo Cortes MD  [facility]    Review of Systems   Constitutional:  Negative for chills and fever.   HENT:  Negative for congestion, ear pain, hearing loss and sore throat.    Eyes:  Negative for pain and visual disturbance.   Respiratory:  Negative for cough and shortness of breath.    Cardiovascular:  Negative for chest pain, palpitations and peripheral edema.   Gastrointestinal:  Positive for heartburn. Negative for abdominal pain, constipation, diarrhea, hematochezia and nausea.        Left inguinal hernia   Genitourinary:  Negative for dysuria, frequency, genital sores, hematuria, impotence, penile discharge and urgency.   Musculoskeletal:  Positive for arthralgias. Negative for joint swelling and myalgias.   Skin:  Negative for rash.   Allergic/Immunologic: Negative for immunocompromised state.   Neurological:  Negative for dizziness, weakness, headaches and paresthesias.   Hematological:  Does not bruise/bleed easily.   Psychiatric/Behavioral:  Negative for mood changes. The patient is not nervous/anxious.            Preventive Care " "Visit  [facility]  Bo Cortes MD, Internal Medicine  Feb 2, 2024        Mary Beyer is a 69 year old, presenting for the following:  No chief complaint on file.        2/2/2024     8:33 AM   Additional Questions   Roomed by Antonio Matt LPN   Accompanied by n/a         Health Care Directive  Patient does not have a Health Care Directive or Living Will: Discussed advance care planning with patient; however, patient declined at this time.    Healthy Habits:     In general, how would you rate your overall health?  Very good    Frequency of exercise:  6-7 days/week    Duration of exercise:  Greater than 60 minutes    Do you usually eat at least 4 servings of fruit and vegetables a day, include whole grains    & fiber and avoid regularly eating high fat or \"junk\" foods?  Yes    Taking medications regularly:  No    Barriers to taking medications:  None    Medication side effects:  None    Ability to successfully perform activities of daily living:  No assistance needed    Additional concerns today:  Yes          2/2/2024   General Health   How would you rate your overall physical health? Good   Feel stress (tense, anxious, or unable to sleep) Not at all         2/2/2024   Nutrition   Diet: Regular (no restrictions)         2/2/2024   Exercise   Days per week of moderate/strenous exercise 5 days         2/2/2024   Social Factors   Frequency of gathering with friends or relatives Once a week   Worry food won't last until get money to buy more No   Food not last or not have enough money for food? No   Do you have housing?  Yes   Are you worried about losing your housing? No   Lack of transportation? No   Unable to get utilities (heat,electricity)? No         2/2/2024   Fall Risk   Fallen 2 or more times in the past year? No    No   Trouble with walking or balance? No    No          2/2/2024   Activities of Daily Living- Home Safety   Needs help with the following daily activites None of the " above   Safety concerns in the home None of the above         2/2/2024   Dental   Dentist two times every year? Yes         2/2/2024   Hearing Screening   Hearing concerns? (!) I FEEL THAT PEOPLE ARE MUMBLING OR NOT SPEAKING CLEARLY.    (!) I NEED TO ASK PEOPLE TO SPEAK UP OR REPEAT THEMSELVES.    (!) IT'S HARD TO FOLLOW A CONVERSATION IN A NOISY RESTAURANT OR CROWDED ROOM.    (!) TROUBLE UNDESTANDING A SPEAKER IN A PUBLIC MEETING OR [employer] SERVICE.    (!) TROUBLE FOLLOWING DIALOGUE IN THE THEATHER.    (!) TROUBLE UNDERSTANDING SOFT OR WHISPERED SPEECH.         2/2/2024   Driving Risk Screening   Patient/family members have concerns about driving No         2/2/2024   General Alertness/Fatigue Screening   Have you been more tired than usual lately? No         2/2/2024   Urinary Incontinence Screening   Bothered by leaking urine in past 6 months No          No data to display                  Today's PHQ-2 Score:       2/2/2024     8:39 AM   PHQ-2 ( 1999 [employer])   Q1: Little interest or pleasure in doing things 0   Q2: Feeling down, depressed or hopeless 0   PHQ-2 Score 0   Q1: Little interest or pleasure in doing things Not at all   Q2: Feeling down, depressed or hopeless Not at all   PHQ-2 Score 0           2/2/2024   Substance Use   Alcohol more than 3/day or more than 7/wk No   Do you have a current opioid prescription? No   How severe/bad is pain from 1 to 10? 0/10 (No Pain)   Do you use any other substances recreationally? No     Social History     Tobacco Use    Smoking status: Never    Smokeless tobacco: Never   Vaping Use    Vaping Use: Never used   Substance Use Topics    Alcohol use: No    Drug use: No           2/2/2024   AAA Screening   Family history of Abdominal Aortic Aneurysm (AAA)? No   Last PSA:   Prostate Specific Antigen Screen   Date Value Ref Range Status   02/02/2024 2.19 0.00 - 4.50 ng/mL Final     The 10-year ASCVD risk score (Bayron MCADAMS, et al., 2019) is: 13.5%    Values used to calculate  the score:      Age: 69 years      Sex: Male      Is Non- : No      Diabetic: No      Tobacco smoker: No      Systolic Blood Pressure: 128 mmHg      Is BP treated: No      HDL Cholesterol: 43 mg/dL      Total Cholesterol: 152 mg/dL            Reviewed and updated as needed this visit by Provider   Tobacco  Allergies  Meds  Problems  Med Hx  Surg Hx  Fam Hx            Current providers sharing in care for this patient include:  Patient Care Team:  Bo Cortes MD as PCP - General (Internal Medicine)  Bo Cortes MD as Assigned PCP    The following health maintenance items are reviewed in Epic and correct as of today:  Health Maintenance   Topic Date Due    RSV VACCINE (Pregnancy & 60+) (1 - 1-dose 60+ series) Never done    Pneumococcal Vaccine: 65+ Years (2 of 2 - PCV) 01/26/2023    MEDICARE ANNUAL WELLNESS VISIT  02/02/2025    BMP  02/02/2025    LIPID  02/02/2025    MICROALBUMIN  02/02/2025    FALL RISK ASSESSMENT  02/02/2025    GLUCOSE  02/02/2027    COLORECTAL CANCER SCREENING  03/01/2027    ADVANCE CARE PLANNING  02/01/2028    DTAP/TDAP/TD IMMUNIZATION (2 - Td or Tdap) 07/06/2031    HEPATITIS C SCREENING  Completed    PHQ-2 (once per calendar year)  Completed    URINALYSIS  Completed    ZOSTER IMMUNIZATION  Completed    COVID-19 Vaccine  Completed    IPV IMMUNIZATION  Aged Out    HPV IMMUNIZATION  Aged Out    MENINGITIS IMMUNIZATION  Aged Out    RSV MONOCLONAL ANTIBODY  Aged Out    INFLUENZA VACCINE  Discontinued     Review of Systems   Constitutional:  Negative for chills and fever.   HENT:  Negative for congestion, ear pain, hearing loss and sore throat.    Eyes:  Negative for pain and visual disturbance.   Respiratory:  Negative for cough and shortness of breath.    Cardiovascular:  Negative for chest pain and palpitations.   Gastrointestinal:  Positive for heartburn. Negative for abdominal pain, constipation, diarrhea, hematochezia and nausea.        Left inguinal hernia  "  Genitourinary:  Negative for dysuria, frequency, genital sores, hematuria, impotence, penile discharge and urgency.   Musculoskeletal:  Positive for arthralgias. Negative for joint swelling and myalgias.   Skin:  Negative for rash.   Allergic/Immunologic: Negative for immunocompromised state.   Neurological:  Negative for dizziness, weakness, headaches and paresthesias.   Hematological:  Does not bruise/bleed easily.   Psychiatric/Behavioral:  The patient is not nervous/anxious.           Objective    Exam  /68 (BP Location: Right arm, Patient Position: Sitting, Cuff Size: Adult Regular)   Pulse 70   Temp 97.3  F (36.3  C) (Temporal)   Resp 20   Ht 1.816 m (5' 11.5\")   Wt 94.9 kg (209 lb 3.2 oz)   SpO2 98%   BMI 28.77 kg/m     Estimated body mass index is 28.77 kg/m  as calculated from the following:    Height as of this encounter: 1.816 m (5' 11.5\").    Weight as of this encounter: 94.9 kg (209 lb 3.2 oz).    Physical Exam  Constitutional:       General: He is not in acute distress.     Appearance: He is well-developed. He is not diaphoretic.   HENT:      Head: Normocephalic and atraumatic.   Eyes:      General: No scleral icterus.     Conjunctiva/sclera: Conjunctivae normal.   Neck:      Vascular: No carotid bruit.   Cardiovascular:      Rate and Rhythm: Normal rate and regular rhythm.      Pulses: Normal pulses.   Pulmonary:      Effort: Pulmonary effort is normal.      Breath sounds: Normal breath sounds.   Abdominal:      Palpations: Abdomen is soft.      Tenderness: There is no abdominal tenderness.      Hernia: A hernia (Left inguinal hernia and umbilical) is present.   Musculoskeletal:         General: Deformity (yodit deformity of right biceps) present.      Cervical back: Neck supple.      Right lower leg: No edema.      Left lower leg: No edema.   Lymphadenopathy:      Cervical: No cervical adenopathy.   Skin:     General: Skin is warm and dry.      Findings: No rash.   Neurological:      " Mental Status: He is alert and oriented to person, place, and time. Mental status is at baseline.   Psychiatric:         Mood and Affect: Mood normal.         Behavior: Behavior normal.       Results for orders placed or performed in visit on 02/02/24   UA reflex to Microscopic     Status: Normal   Result Value Ref Range    Color Urine Light Yellow Colorless, Straw, Light Yellow, Yellow    Appearance Urine Clear Clear    Glucose Urine Negative Negative mg/dL    Bilirubin Urine Negative Negative    Ketones Urine Negative Negative mg/dL    Specific Gravity Urine 1.021 1.000 - 1.030    Blood Urine Negative Negative    pH Urine 5.5 5.0 - 9.0    Protein Albumin Urine Negative Negative mg/dL    Urobilinogen Urine Normal Normal, 2.0 mg/dL    Nitrite Urine Negative Negative    Leukocyte Esterase Urine Negative Negative    Narrative    Microscopic not indicated   Hemoglobin A1c     Status: Normal   Result Value Ref Range    Hemoglobin A1C 5.2 4.0 - 6.2 %   CBC with platelets     Status: Normal   Result Value Ref Range    WBC Count 5.3 4.0 - 11.0 10e3/uL    RBC Count 5.52 4.40 - 5.90 10e6/uL    Hemoglobin 16.4 13.3 - 17.7 g/dL    Hematocrit 46.0 40.0 - 53.0 %    MCV 83 78 - 100 fL    MCH 29.7 26.5 - 33.0 pg    MCHC 35.7 31.5 - 36.5 g/dL    RDW 12.4 10.0 - 15.0 %    Platelet Count 167 150 - 450 10e3/uL   Albumin Random Urine Quantitative with Creat Ratio     Status: Abnormal   Result Value Ref Range    Creatinine Urine mg/dL 109.8 mg/dL    Albumin Urine mg/L 54.5 mg/L    Albumin Urine mg/g Cr 49.64 (H) 0.00 - 17.00 mg/g Cr   Lipid Profile     Status: None   Result Value Ref Range    Cholesterol 152 <200 mg/dL    Triglycerides 101 <150 mg/dL    Direct Measure HDL 43 >=40 mg/dL    LDL Cholesterol Calculated 89 <=100 mg/dL    Non HDL Cholesterol 109 <130 mg/dL    Patient Fasting > 8hrs? Yes     Narrative    Cholesterol  Desirable:  <200 mg/dL    Triglycerides  Normal:  Less than 150 mg/dL  Borderline High:  150-199 mg/dL  High:   200-499 mg/dL  Very High:  Greater than or equal to 500 mg/dL    Direct Measure HDL  Female:  Greater than or equal to 50 mg/dL   Male:  Greater than or equal to 40 mg/dL    LDL Cholesterol  Desirable:  <100mg/dL  Above Desirable:  100-129 mg/dL   Borderline High:  130-159 mg/dL   High:  160-189 mg/dL   Very High:  >= 190 mg/dL    Non HDL Cholesterol  Desirable:  130 mg/dL  Above Desirable:  130-159 mg/dL  Borderline High:  160-189 mg/dL  High:  190-219 mg/dL  Very High:  Greater than or equal to 220 mg/dL   Comprehensive metabolic panel     Status: Abnormal   Result Value Ref Range    Sodium 139 135 - 145 mmol/L    Potassium 4.7 3.4 - 5.3 mmol/L    Carbon Dioxide (CO2) 29 22 - 29 mmol/L    Anion Gap 7 7 - 15 mmol/L    Urea Nitrogen 24.5 (H) 8.0 - 23.0 mg/dL    Creatinine 1.14 0.67 - 1.17 mg/dL    GFR Estimate 70 >60 mL/min/1.73m2    Calcium 9.2 8.8 - 10.2 mg/dL    Chloride 103 98 - 107 mmol/L    Glucose 108 (H) 70 - 99 mg/dL    Alkaline Phosphatase 74 40 - 150 U/L    AST 42 0 - 45 U/L    ALT 48 0 - 70 U/L    Protein Total 6.8 6.4 - 8.3 g/dL    Albumin 4.1 3.5 - 5.2 g/dL    Bilirubin Total 1.3 (H) <=1.2 mg/dL   Prostate Specific Antigen Screen     Status: Normal   Result Value Ref Range    Prostate Specific Antigen Screen 2.19 0.00 - 4.50 ng/mL    Narrative    This result is obtained using the Roche Elecsys total PSA method on the janey [id number] immunoassay analyzer. Results obtained with different assay methods or kits cannot be used interchangeably.     Urinalysis is normal.  Hemoglobin A1c is normal.  CBC is normal, platelet count is at low end of normal range.  Recommend monitoring.    Urine microalbumin is elevated.  Cholesterol levels are at goal.  GFR 70.  Creatinine is at baseline.  Glucose elevated.  Total bilirubin elevated but stable.  Liver enzymes normal.  Prostate lab/PSA is normal.          2/2/2024   Mini Cog   Clock Draw Score 2 Normal   3 Item Recall 0 objects recalled   Mini Cog Total Score 2             Signed Electronically by: Bo Cortes MD

## 2024-02-06 ENCOUNTER — TELEPHONE (OUTPATIENT)
Dept: INTERNAL MEDICINE | Facility: OTHER | Age: 69
End: 2024-02-06
Payer: COMMERCIAL

## 2024-02-06 DIAGNOSIS — K42.9 UMBILICAL HERNIA WITHOUT OBSTRUCTION AND WITHOUT GANGRENE: Primary | ICD-10-CM

## 2024-02-06 NOTE — TELEPHONE ENCOUNTER
Was in yesterday and now would like a referral for surgeon for hernia here at [facility]

## 2024-02-08 ENCOUNTER — OFFICE VISIT (OUTPATIENT)
Dept: SURGERY | Facility: OTHER | Age: 69
End: 2024-02-08
Attending: SURGERY
Payer: COMMERCIAL

## 2024-02-08 VITALS
WEIGHT: 207.8 LBS | RESPIRATION RATE: 17 BRPM | OXYGEN SATURATION: 98 % | HEART RATE: 70 BPM | HEIGHT: 72 IN | BODY MASS INDEX: 28.15 KG/M2 | SYSTOLIC BLOOD PRESSURE: 125 MMHG | TEMPERATURE: 97 F | DIASTOLIC BLOOD PRESSURE: 70 MMHG

## 2024-02-08 DIAGNOSIS — K42.9 UMBILICAL HERNIA WITHOUT OBSTRUCTION AND WITHOUT GANGRENE: Primary | ICD-10-CM

## 2024-02-08 DIAGNOSIS — K40.90 INGUINAL HERNIA OF LEFT SIDE WITHOUT OBSTRUCTION OR GANGRENE: ICD-10-CM

## 2024-02-08 PROCEDURE — 99214 OFFICE O/P EST MOD 30 MIN: CPT | Performed by: SURGERY

## 2024-02-08 PROCEDURE — G0463 HOSPITAL OUTPT CLINIC VISIT: HCPCS

## 2024-02-08 RX ORDER — CLINDAMYCIN PHOSPHATE 900 MG/50ML
900 INJECTION, SOLUTION INTRAVENOUS
Status: CANCELLED | OUTPATIENT
Start: 2024-02-08

## 2024-02-08 RX ORDER — ACETAMINOPHEN 325 MG/1
975 TABLET ORAL ONCE
Status: CANCELLED | OUTPATIENT
Start: 2024-02-08 | End: 2024-02-08

## 2024-02-08 RX ORDER — CLINDAMYCIN PHOSPHATE 900 MG/50ML
900 INJECTION, SOLUTION INTRAVENOUS SEE ADMIN INSTRUCTIONS
Status: CANCELLED | OUTPATIENT
Start: 2024-02-08

## 2024-02-08 ASSESSMENT — PAIN SCALES - GENERAL: PAINLEVEL: MILD PAIN (2)

## 2024-02-08 NOTE — PROGRESS NOTES
GENERAL SURGERY CONSULTATION NOTE    Stas Harman   [address] [address]  69 year old  male    Primary Care Provider:  Bo Cortes      HPI: Stas Harman presents to clinic with umbilical bulge and left groin bulge.  The umbilical bulge was present for the last 5 years or so.  Patient does remember exactly when it came on or when he noticed it.  This bulge does not really bother him unless he presses on it.  He notes that it is always soft and reducible but it is uncomfortable to reduce.  Patient also complains of a new left groin bulge he noticed a few weeks ago.  He does not remember exactly when this happened or 1 action that caused this.  He does note that he is quite active and does do heavy lifting and lots of outdoor activities including walking.  Patient also is retired from a long career of construction work.  Patient is a non-smoker.  Denies problems with constipation.  Patient denies obstructive symptoms.  Patient is easily able to achieve 4 metabolic equivalents of activity.  No significant cardiopulmonary history.  No blood thinners.  Patient has tolerated general anesthetic in the past with no issues.        REVIEW OF SYSTEMS:    GENERAL: No fevers or chills. Denies fatigue, recent weight loss.  HEENT: No sinus drainage. No changes with vision or hearing. No difficulty swallowing.   LYMPHATICS:  Noswollen nodes in axilla, neck or groin.  CARDIOVASCULAR: Denies chest pain, palpitations and dyspnea on exertion.  PULMONARY: No shortness of breath or cough. No increase in sputum production.  GI: Denies melena,bright red blood in stools. No hematemesis. No constipation or diarrhea.  : No dysuria or hematuria.  SKIN: No recent rashes or ulcers.   HEMATOLOGY:  No history of easy bruising or bleeding.  ENDOCRINE:  No history of diabetes or thyroid problems.  NEUROLOGY:  No history of seizures or headaches. No motor or sensory changes.        Patient Active Problem List    Diagnosis    AC (acromioclavicular) joint arthritis    Mixed hyperlipidemia    S/P shoulder surgery - Right    CKD (chronic kidney disease) stage 2, GFR 60-89 ml/min    Tubular adenoma of colon - January 2022 - Colonoscopy Recheck in 2027    Biceps rupture, proximal, right, subsequent encounter    Umbilical hernia without obstruction and without gangrene    Inguinal hernia of left side without obstruction or gangrene       Past Medical History:   Diagnosis Date    Affections of shoulder region     11/20/2014    Arthropathy of shoulder region     11/20/2014    Other postprocedural states     12/15/2014    Sprain of rotator cuff capsule     11/20/2014       Past Surgical History:   Procedure Laterality Date    COLONOSCOPY  03/23/2007    f/u 10 years    COLONOSCOPY N/A 03/01/2022    1 small tubular adenoma, 5 year follow up, 3/1/2027    EXTRACTION(S) DENTAL      No Comments Provided    OTHER SURGICAL HISTORY      12/15/2014,[id number],OTHER,Right    RELEASE CARPAL TUNNEL      No Comments Provided       Family History   Problem Relation Age of Onset    Genetic Disorder Other         Genetic,No FHx of DM       Social History     Social History Narrative     - Wife Candy    4 children.       Social History     Socioeconomic History    Marital status:      Spouse name: Candy    Number of children: Not on file    Years of education: Not on file    Highest education level: Not on file   Occupational History    Not on file   Tobacco Use    Smoking status: Never    Smokeless tobacco: Never   Vaping Use    Vaping Use: Never used   Substance and Sexual Activity    Alcohol use: No    Drug use: No    Sexual activity: Yes     Partners: Female   Other Topics Concern    Parent/sibling w/ CABG, MI or angioplasty before 65F 55M? Not Asked   Social History Narrative     - Wife Candy    4 children.     Social Determinants of Health     Financial Resource Strain: Low Risk  (2/2/2024)    Financial Resource Strain      Within the past 12 months, have you or your family members you live with been unable to get utilities (heat, electricity) when it was really needed?: No   Food Insecurity: Low Risk  (2/2/2024)    Food Insecurity     Within the past 12 months, did you worry that your food would run out before you got money to buy more?: No     Within the past 12 months, did the food you bought just not last and you didn t have money to get more?: No   Transportation Needs: Low Risk  (2/2/2024)    Transportation Needs     Within the past 12 months, has lack of transportation kept you from medical appointments, getting your medicines, non-medical meetings or appointments, work, or from getting things that you need?: No   Physical Activity: Unknown (2/2/2024)    Exercise Vital Sign     Days of Exercise per Week: 5 days     Minutes of Exercise per Session: Not on file   Stress: No Stress Concern Present (2/2/2024)    [employer] - Occupational Stress Questionnaire     Feeling of Stress : Not at all   Social Connections: Unknown (2/2/2024)    Social Connection and Isolation Panel [NHANES]     Frequency of Communication with Friends and Family: Not on file     Frequency of Social Gatherings with Friends and Family: Once a week     Attends L[employer] Services: Not on file     Active Member of Clubs or Organizations: Not on file     Attends Club or Organization Meetings: Not on file     Marital Status: Not on file   Interpersonal Safety: Low Risk  (2/8/2024)    Interpersonal Safety     Do you feel physically and emotionally safe where you currently live?: Yes     Within the past 12 months, have you been hit, slapped, kicked or otherwise physically hurt by someone?: No     Within the past 12 months, have you been humiliated or emotionally abused in other ways by your partner or ex-partner?: No   Housing Stability: Low Risk  (2/2/2024)    Housing Stability     Do you have housing? : Yes     Are you worried about  "losing your housing?: No       aspirin 81 MG EC tablet, Take 81 mg by mouth daily  atorvastatin (LIPITOR) 20 MG tablet, Take 1 tablet (20 mg) by mouth daily  Omega-3 Fatty Acids (FISH OIL PO), Take by mouth 2 times daily 2000mg twice daily    No current facility-administered medications on file prior to visit.        ALLERGIES/SENSITIVITIES:   Allergies   Allergen Reactions    Penicillins Rash     Childhood rash       PHYSICAL EXAM:     /70 (BP Location: Right arm, Patient Position: Sitting, Cuff Size: Adult Large)   Pulse 70   Temp 97  F (36.1  C) (Tympanic)   Resp 17   Ht 1.816 m (5' 11.5\")   Wt 94.3 kg (207 lb 12.8 oz)   SpO2 98%   BMI 28.58 kg/m      General Appearance:   Sitting up in the chair, no apparent distress  HEENT: Pupils are equal and reactive, no scleral icterus,   Heart & CV:  RRR no murmur.  Intact distal pulses, good cap refill.  LUNGS: No increased work of breathing.Lugns are CTA B/L, no wheezing or crackles.  Abd: Soft, nontender, nondistended.  Reducible umbilical hernia, 1 to 1.5 cm defect.  Reducible left inguinal hernia.  No evidence of hernia on the right.  Ext: Normal bulk and tone, lower extremity edema  Neuro: Alert and oriented, normal speech and mentation        CONSULTATION ASSESSMENT AND PLAN:    69 year old male with reducible umbilical hernia and reducible and initial left inguinal hernia.  The patient is an excellent candidate for laparoscopic inguinal hernia repair with umbilical hernia repair.  Likely use mesh for both repairs.  The technical details of this procedure were discussed with the patient along with the risk and benefits include bleeding, infection, hernia recurrence and injury surrounding structures including bladder, iliac vessels, spermatic cord with the possibility of leading to loss of testicle and chronic pain.  The patient demonstrates understanding and is willing to proceed.    Scheduled for laparoscopic left inguinal hernia repair with mesh and " umbilical hernia repair on 2/14/2024        Medhat Mackey MD on 2/8/2024 at 9:49 AM

## 2024-02-08 NOTE — NURSING NOTE
"Chief Complaint   Patient presents with    Consult     2 Hernias      One at the belly button, and the other is in the left groin region. Belly button one has been there for 5-6 years but the groin one is new. Late summer started to notice the symptoms but didn't realize it was a hernia.     Initial /70 (BP Location: Right arm, Patient Position: Sitting, Cuff Size: Adult Large)   Pulse 70   Temp 97  F (36.1  C) (Tympanic)   Resp 17   Ht 1.816 m (5' 11.5\")   Wt 94.3 kg (207 lb 12.8 oz)   SpO2 98%   BMI 28.58 kg/m   Estimated body mass index is 28.58 kg/m  as calculated from the following:    Height as of this encounter: 1.816 m (5' 11.5\").    Weight as of this encounter: 94.3 kg (207 lb 12.8 oz).  Meds Reconciled: complete    Edita Dan RN     "

## 2024-02-08 NOTE — H&P (VIEW-ONLY)
GENERAL SURGERY CONSULTATION NOTE    Stas Harman   [address] [address] [id number]  69 year old  male    Primary Care Provider:  Bo Cortes      HPI: Stas Harman presents to clinic with umbilical bulge and left groin bulge.  The umbilical bulge was present for the last 5 years or so.  Patient does remember exactly when it came on or when he noticed it.  This bulge does not really bother him unless he presses on it.  He notes that it is always soft and reducible but it is uncomfortable to reduce.  Patient also complains of a new left groin bulge he noticed a few weeks ago.  He does not remember exactly when this happened or 1 action that caused this.  He does note that he is quite active and does do heavy lifting and lots of outdoor activities including walking.  Patient also is retired from a long career of construction work.  Patient is a non-smoker.  Denies problems with constipation.  Patient denies obstructive symptoms.  Patient is easily able to achieve 4 metabolic equivalents of activity.  No significant cardiopulmonary history.  No blood thinners.  Patient has tolerated general anesthetic in the past with no issues.        REVIEW OF SYSTEMS:    GENERAL: No fevers or chills. Denies fatigue, recent weight loss.  HEENT: No sinus drainage. No changes with vision or hearing. No difficulty swallowing.   LYMPHATICS:  Noswollen nodes in axilla, neck or groin.  CARDIOVASCULAR: Denies chest pain, palpitations and dyspnea on exertion.  PULMONARY: No shortness of breath or cough. No increase in sputum production.  GI: Denies melena,bright red blood in stools. No hematemesis. No constipation or diarrhea.  : No dysuria or hematuria.  SKIN: No recent rashes or ulcers.   HEMATOLOGY:  No history of easy bruising or bleeding.  ENDOCRINE:  No history of diabetes or thyroid problems.  NEUROLOGY:  No history of seizures or headaches. No motor or sensory changes.        Patient Active Problem List    Diagnosis    AC (acromioclavicular) joint arthritis    Mixed hyperlipidemia    S/P shoulder surgery - Right    CKD (chronic kidney disease) stage 2, GFR 60-89 ml/min    Tubular adenoma of colon - January 2022 - Colonoscopy Recheck in 2027    Biceps rupture, proximal, right, subsequent encounter    Umbilical hernia without obstruction and without gangrene    Inguinal hernia of left side without obstruction or gangrene       Past Medical History:   Diagnosis Date    Affections of shoulder region     11/20/2014    Arthropathy of shoulder region     11/20/2014    Other postprocedural states     12/15/2014    Sprain of rotator cuff capsule     11/20/2014       Past Surgical History:   Procedure Laterality Date    COLONOSCOPY  03/23/2007    f/u 10 years    COLONOSCOPY N/A 03/01/2022    1 small tubular adenoma, 5 year follow up, 3/1/2027    EXTRACTION(S) DENTAL      No Comments Provided    OTHER SURGICAL HISTORY      12/15/2014,[id number],OTHER,Right    RELEASE CARPAL TUNNEL      No Comments Provided       Family History   Problem Relation Age of Onset    Genetic Disorder Other         Genetic,No FHx of DM       Social History     Social History Narrative     - Wife Candy    4 children.       Social History     Socioeconomic History    Marital status:      Spouse name: Candy    Number of children: Not on file    Years of education: Not on file    Highest education level: Not on file   Occupational History    Not on file   Tobacco Use    Smoking status: Never    Smokeless tobacco: Never   Vaping Use    Vaping Use: Never used   Substance and Sexual Activity    Alcohol use: No    Drug use: No    Sexual activity: Yes     Partners: Female   Other Topics Concern    Parent/sibling w/ CABG, MI or angioplasty before 65F 55M? Not Asked   Social History Narrative     - Wife Candy    4 children.     Social Determinants of Health     Financial Resource Strain: Low Risk  (2/2/2024)    Financial Resource Strain      Within the past 12 months, have you or your family members you live with been unable to get utilities (heat, electricity) when it was really needed?: No   Food Insecurity: Low Risk  (2/2/2024)    Food Insecurity     Within the past 12 months, did you worry that your food would run out before you got money to buy more?: No     Within the past 12 months, did the food you bought just not last and you didn t have money to get more?: No   Transportation Needs: Low Risk  (2/2/2024)    Transportation Needs     Within the past 12 months, has lack of transportation kept you from medical appointments, getting your medicines, non-medical meetings or appointments, work, or from getting things that you need?: No   Physical Activity: Unknown (2/2/2024)    Exercise Vital Sign     Days of Exercise per Week: 5 days     Minutes of Exercise per Session: Not on file   Stress: No Stress Concern Present (2/2/2024)    [place] [employer] - Occupational Stress Questionnaire     Feeling of Stress : Not at all   Social Connections: Unknown (2/2/2024)    Social Connection and Isolation Panel [NHANES]     Frequency of Communication with Friends and Family: Not on file     Frequency of Social Gatherings with Friends and Family: Once a week     Attends Ep[employer] Services: Not on file     Active Member of Clubs or Organizations: Not on file     Attends Club or Organization Meetings: Not on file     Marital Status: Not on file   Interpersonal Safety: Low Risk  (2/8/2024)    Interpersonal Safety     Do you feel physically and emotionally safe where you currently live?: Yes     Within the past 12 months, have you been hit, slapped, kicked or otherwise physically hurt by someone?: No     Within the past 12 months, have you been humiliated or emotionally abused in other ways by your partner or ex-partner?: No   Housing Stability: Low Risk  (2/2/2024)    Housing Stability     Do you have housing? : Yes     Are you worried about  "losing your housing?: No       aspirin 81 MG EC tablet, Take 81 mg by mouth daily  atorvastatin (LIPITOR) 20 MG tablet, Take 1 tablet (20 mg) by mouth daily  Omega-3 Fatty Acids (FISH OIL PO), Take by mouth 2 times daily 2000mg twice daily    No current facility-administered medications on file prior to visit.        ALLERGIES/SENSITIVITIES:   Allergies   Allergen Reactions    Penicillins Rash     Childhood rash       PHYSICAL EXAM:     /70 (BP Location: Right arm, Patient Position: Sitting, Cuff Size: Adult Large)   Pulse 70   Temp 97  F (36.1  C) (Tympanic)   Resp 17   Ht 1.816 m (5' 11.5\")   Wt 94.3 kg (207 lb 12.8 oz)   SpO2 98%   BMI 28.58 kg/m      General Appearance:   Sitting up in the chair, no apparent distress  HEENT: Pupils are equal and reactive, no scleral icterus,   Heart & CV:  RRR no murmur.  Intact distal pulses, good cap refill.  LUNGS: No increased work of breathing.Lugns are CTA B/L, no wheezing or crackles.  Abd: Soft, nontender, nondistended.  Reducible umbilical hernia, 1 to 1.5 cm defect.  Reducible left inguinal hernia.  No evidence of hernia on the right.  Ext: Normal bulk and tone, lower extremity edema  Neuro: Alert and oriented, normal speech and mentation        CONSULTATION ASSESSMENT AND PLAN:    69 year old male with reducible umbilical hernia and reducible and initial left inguinal hernia.  The patient is an excellent candidate for laparoscopic inguinal hernia repair with umbilical hernia repair.  Likely use mesh for both repairs.  The technical details of this procedure were discussed with the patient along with the risk and benefits include bleeding, infection, hernia recurrence and injury surrounding structures including bladder, iliac vessels, spermatic cord with the possibility of leading to loss of testicle and chronic pain.  The patient demonstrates understanding and is willing to proceed.    Scheduled for laparoscopic left inguinal hernia repair with mesh and " umbilical hernia repair on 2/14/2024        Medhat Mackey MD on 2/8/2024 at 9:49 AM

## 2024-02-13 ENCOUNTER — ANESTHESIA EVENT (OUTPATIENT)
Dept: SURGERY | Facility: OTHER | Age: 69
End: 2024-02-13
Payer: COMMERCIAL

## 2024-02-14 ENCOUNTER — HOSPITAL ENCOUNTER (OUTPATIENT)
Facility: OTHER | Age: 69
Discharge: HOME | End: 2024-02-14
Attending: SURGERY | Admitting: SURGERY
Payer: COMMERCIAL

## 2024-02-14 ENCOUNTER — ANESTHESIA (OUTPATIENT)
Dept: SURGERY | Facility: OTHER | Age: 69
End: 2024-02-14
Payer: COMMERCIAL

## 2024-02-14 VITALS
BODY MASS INDEX: 28.98 KG/M2 | SYSTOLIC BLOOD PRESSURE: 147 MMHG | WEIGHT: 207 LBS | OXYGEN SATURATION: 92 % | TEMPERATURE: 97.5 F | HEART RATE: 72 BPM | HEIGHT: 71 IN | DIASTOLIC BLOOD PRESSURE: 92 MMHG | RESPIRATION RATE: 18 BRPM

## 2024-02-14 DIAGNOSIS — K40.90 INGUINAL HERNIA OF LEFT SIDE WITHOUT OBSTRUCTION OR GANGRENE: ICD-10-CM

## 2024-02-14 DIAGNOSIS — K42.9 UMBILICAL HERNIA WITHOUT OBSTRUCTION AND WITHOUT GANGRENE: ICD-10-CM

## 2024-02-14 DIAGNOSIS — Z98.890 POSTOPERATIVE STATE: Primary | ICD-10-CM

## 2024-02-14 LAB
ATRIAL RATE - MUSE: 63 BPM
DIASTOLIC BLOOD PRESSURE - MUSE: NORMAL MMHG
INTERPRETATION ECG - MUSE: NORMAL
P AXIS - MUSE: 18 DEGREES
PR INTERVAL - MUSE: 198 MS
QRS DURATION - MUSE: 90 MS
QT - MUSE: 404 MS
QTC - MUSE: 413 MS
R AXIS - MUSE: 0 DEGREES
SYSTOLIC BLOOD PRESSURE - MUSE: NORMAL MMHG
T AXIS - MUSE: 30 DEGREES
VENTRICULAR RATE- MUSE: 63 BPM

## 2024-02-14 PROCEDURE — 250N000013 HC RX MED GY IP 250 OP 250 PS 637: Performed by: SURGERY

## 2024-02-14 PROCEDURE — 250N000011 HC RX IP 250 OP 636: Performed by: SURGERY

## 2024-02-14 PROCEDURE — 272N000001 HC OR GENERAL SUPPLY STERILE: Performed by: SURGERY

## 2024-02-14 PROCEDURE — 49591 RPR AA HRN 1ST < 3 CM RDC: CPT | Performed by: SURGERY

## 2024-02-14 PROCEDURE — C1781 MESH (IMPLANTABLE): HCPCS | Performed by: SURGERY

## 2024-02-14 PROCEDURE — 250N000011 HC RX IP 250 OP 636: Performed by: NURSE ANESTHETIST, CERTIFIED REGISTERED

## 2024-02-14 PROCEDURE — 258N000003 HC RX IP 258 OP 636: Performed by: NURSE ANESTHETIST, CERTIFIED REGISTERED

## 2024-02-14 PROCEDURE — 360N000077 HC SURGERY LEVEL 4, PER MIN: Performed by: SURGERY

## 2024-02-14 PROCEDURE — 250N000025 HC SEVOFLURANE, PER MIN: Performed by: SURGERY

## 2024-02-14 PROCEDURE — 710N000012 HC RECOVERY PHASE 2, PER MINUTE: Performed by: SURGERY

## 2024-02-14 PROCEDURE — 93010 ELECTROCARDIOGRAM REPORT: CPT | Performed by: INTERNAL MEDICINE

## 2024-02-14 PROCEDURE — 370N000017 HC ANESTHESIA TECHNICAL FEE, PER MIN: Performed by: SURGERY

## 2024-02-14 PROCEDURE — 710N000010 HC RECOVERY PHASE 1, LEVEL 2, PER MIN: Performed by: SURGERY

## 2024-02-14 PROCEDURE — 250N000009 HC RX 250: Performed by: NURSE ANESTHETIST, CERTIFIED REGISTERED

## 2024-02-14 PROCEDURE — 250N000009 HC RX 250: Performed by: SURGERY

## 2024-02-14 PROCEDURE — 49650 LAP ING HERNIA REPAIR INIT: CPT | Performed by: NURSE ANESTHETIST, CERTIFIED REGISTERED

## 2024-02-14 PROCEDURE — 49650 LAP ING HERNIA REPAIR INIT: CPT | Mod: LT | Performed by: SURGERY

## 2024-02-14 PROCEDURE — 93005 ELECTROCARDIOGRAM TRACING: CPT

## 2024-02-14 PROCEDURE — 999N000141 HC STATISTIC PRE-PROCEDURE NURSING ASSESSMENT: Performed by: SURGERY

## 2024-02-14 DEVICE — LAPAROSCOPIC SELF-FIXATING MESH POLYESTER WITH POLYLACTIC ACID GRIPS AND COLLAGEN FILM
Type: IMPLANTABLE DEVICE | Site: ABDOMEN | Status: FUNCTIONAL
Brand: PROGRIP

## 2024-02-14 RX ORDER — LIDOCAINE 40 MG/G
CREAM TOPICAL
Status: DISCONTINUED | OUTPATIENT
Start: 2024-02-14 | End: 2024-02-14 | Stop reason: HOSPADM

## 2024-02-14 RX ORDER — FENTANYL CITRATE 50 UG/ML
50 INJECTION, SOLUTION INTRAMUSCULAR; INTRAVENOUS EVERY 5 MIN PRN
Status: DISCONTINUED | OUTPATIENT
Start: 2024-02-14 | End: 2024-02-14 | Stop reason: HOSPADM

## 2024-02-14 RX ORDER — LIDOCAINE HYDROCHLORIDE 20 MG/ML
INJECTION, SOLUTION INFILTRATION; PERINEURAL PRN
Status: DISCONTINUED | OUTPATIENT
Start: 2024-02-14 | End: 2024-02-14

## 2024-02-14 RX ORDER — ONDANSETRON 2 MG/ML
INJECTION INTRAMUSCULAR; INTRAVENOUS PRN
Status: DISCONTINUED | OUTPATIENT
Start: 2024-02-14 | End: 2024-02-14

## 2024-02-14 RX ORDER — SENNA AND DOCUSATE SODIUM 50; 8.6 MG/1; MG/1
1 TABLET, FILM COATED ORAL PRN
Qty: 20 TABLET | Refills: 0 | Status: SHIPPED | OUTPATIENT
Start: 2024-02-14

## 2024-02-14 RX ORDER — IBUPROFEN 200 MG
600 TABLET ORAL
Status: DISCONTINUED | OUTPATIENT
Start: 2024-02-14 | End: 2024-02-14 | Stop reason: HOSPADM

## 2024-02-14 RX ORDER — DEXAMETHASONE SODIUM PHOSPHATE 4 MG/ML
INJECTION, SOLUTION INTRA-ARTICULAR; INTRALESIONAL; INTRAMUSCULAR; INTRAVENOUS; SOFT TISSUE PRN
Status: DISCONTINUED | OUTPATIENT
Start: 2024-02-14 | End: 2024-02-14

## 2024-02-14 RX ORDER — KETAMINE HYDROCHLORIDE 10 MG/ML
INJECTION INTRAMUSCULAR; INTRAVENOUS PRN
Status: DISCONTINUED | OUTPATIENT
Start: 2024-02-14 | End: 2024-02-14

## 2024-02-14 RX ORDER — ONDANSETRON 2 MG/ML
4 INJECTION INTRAMUSCULAR; INTRAVENOUS EVERY 30 MIN PRN
Status: DISCONTINUED | OUTPATIENT
Start: 2024-02-14 | End: 2024-02-14 | Stop reason: HOSPADM

## 2024-02-14 RX ORDER — ONDANSETRON 4 MG/1
4 TABLET, ORALLY DISINTEGRATING ORAL EVERY 30 MIN PRN
Status: DISCONTINUED | OUTPATIENT
Start: 2024-02-14 | End: 2024-02-14 | Stop reason: HOSPADM

## 2024-02-14 RX ORDER — FENTANYL CITRATE 50 UG/ML
25 INJECTION, SOLUTION INTRAMUSCULAR; INTRAVENOUS EVERY 5 MIN PRN
Status: DISCONTINUED | OUTPATIENT
Start: 2024-02-14 | End: 2024-02-14 | Stop reason: HOSPADM

## 2024-02-14 RX ORDER — CLINDAMYCIN PHOSPHATE 900 MG/50ML
900 INJECTION, SOLUTION INTRAVENOUS
Status: COMPLETED | OUTPATIENT
Start: 2024-02-14 | End: 2024-02-14

## 2024-02-14 RX ORDER — KETOROLAC TROMETHAMINE 30 MG/ML
INJECTION, SOLUTION INTRAMUSCULAR; INTRAVENOUS PRN
Status: DISCONTINUED | OUTPATIENT
Start: 2024-02-14 | End: 2024-02-14

## 2024-02-14 RX ORDER — CLINDAMYCIN PHOSPHATE 900 MG/50ML
900 INJECTION, SOLUTION INTRAVENOUS SEE ADMIN INSTRUCTIONS
Status: DISCONTINUED | OUTPATIENT
Start: 2024-02-14 | End: 2024-02-14 | Stop reason: HOSPADM

## 2024-02-14 RX ORDER — NALOXONE HYDROCHLORIDE 0.4 MG/ML
0.2 INJECTION, SOLUTION INTRAMUSCULAR; INTRAVENOUS; SUBCUTANEOUS
Status: DISCONTINUED | OUTPATIENT
Start: 2024-02-14 | End: 2024-02-14 | Stop reason: HOSPADM

## 2024-02-14 RX ORDER — SODIUM CHLORIDE, SODIUM LACTATE, POTASSIUM CHLORIDE, CALCIUM CHLORIDE 600; 310; 30; 20 MG/100ML; MG/100ML; MG/100ML; MG/100ML
INJECTION, SOLUTION INTRAVENOUS CONTINUOUS
Status: DISCONTINUED | OUTPATIENT
Start: 2024-02-14 | End: 2024-02-14 | Stop reason: HOSPADM

## 2024-02-14 RX ORDER — FENTANYL CITRATE 50 UG/ML
INJECTION, SOLUTION INTRAMUSCULAR; INTRAVENOUS PRN
Status: DISCONTINUED | OUTPATIENT
Start: 2024-02-14 | End: 2024-02-14

## 2024-02-14 RX ORDER — HYDROMORPHONE HCL IN WATER/PF 6 MG/30 ML
0.2 PATIENT CONTROLLED ANALGESIA SYRINGE INTRAVENOUS EVERY 5 MIN PRN
Status: DISCONTINUED | OUTPATIENT
Start: 2024-02-14 | End: 2024-02-14 | Stop reason: HOSPADM

## 2024-02-14 RX ORDER — OXYCODONE AND ACETAMINOPHEN 5; 325 MG/1; MG/1
1 TABLET ORAL EVERY 6 HOURS PRN
Qty: 12 TABLET | Refills: 0 | Status: SHIPPED | OUTPATIENT
Start: 2024-02-14 | End: 2024-02-17

## 2024-02-14 RX ORDER — ONDANSETRON 4 MG/1
4 TABLET, ORALLY DISINTEGRATING ORAL
Status: DISCONTINUED | OUTPATIENT
Start: 2024-02-14 | End: 2024-02-14 | Stop reason: HOSPADM

## 2024-02-14 RX ORDER — NALOXONE HYDROCHLORIDE 0.4 MG/ML
0.4 INJECTION, SOLUTION INTRAMUSCULAR; INTRAVENOUS; SUBCUTANEOUS
Status: DISCONTINUED | OUTPATIENT
Start: 2024-02-14 | End: 2024-02-14 | Stop reason: HOSPADM

## 2024-02-14 RX ORDER — ACETAMINOPHEN 325 MG/1
975 TABLET ORAL ONCE
Status: COMPLETED | OUTPATIENT
Start: 2024-02-14 | End: 2024-02-14

## 2024-02-14 RX ORDER — BUPIVACAINE HYDROCHLORIDE AND EPINEPHRINE 5; 5 MG/ML; UG/ML
INJECTION, SOLUTION EPIDURAL; INTRACAUDAL; PERINEURAL PRN
Status: DISCONTINUED | OUTPATIENT
Start: 2024-02-14 | End: 2024-02-14 | Stop reason: HOSPADM

## 2024-02-14 RX ORDER — PROPOFOL 10 MG/ML
INJECTION, EMULSION INTRAVENOUS PRN
Status: DISCONTINUED | OUTPATIENT
Start: 2024-02-14 | End: 2024-02-14

## 2024-02-14 RX ORDER — OXYCODONE HYDROCHLORIDE 5 MG/1
5 TABLET ORAL
Status: DISCONTINUED | OUTPATIENT
Start: 2024-02-14 | End: 2024-02-14 | Stop reason: HOSPADM

## 2024-02-14 RX ORDER — HYDROMORPHONE HCL IN WATER/PF 6 MG/30 ML
0.4 PATIENT CONTROLLED ANALGESIA SYRINGE INTRAVENOUS EVERY 5 MIN PRN
Status: DISCONTINUED | OUTPATIENT
Start: 2024-02-14 | End: 2024-02-14 | Stop reason: HOSPADM

## 2024-02-14 RX ADMIN — SODIUM CHLORIDE, SODIUM LACTATE, POTASSIUM CHLORIDE, AND CALCIUM CHLORIDE 100 ML/HR: 600; 310; 30; 20 INJECTION, SOLUTION INTRAVENOUS at 07:06

## 2024-02-14 RX ADMIN — ACETAMINOPHEN 975 MG: 325 TABLET, FILM COATED ORAL at 06:48

## 2024-02-14 RX ADMIN — FENTANYL CITRATE 100 MCG: 50 INJECTION INTRAMUSCULAR; INTRAVENOUS at 07:40

## 2024-02-14 RX ADMIN — ONDANSETRON HYDROCHLORIDE 4 MG: 2 SOLUTION INTRAMUSCULAR; INTRAVENOUS at 07:42

## 2024-02-14 RX ADMIN — DEXAMETHASONE SODIUM PHOSPHATE 4 MG: 4 INJECTION, SOLUTION INTRA-ARTICULAR; INTRALESIONAL; INTRAMUSCULAR; INTRAVENOUS; SOFT TISSUE at 07:56

## 2024-02-14 RX ADMIN — ROCURONIUM BROMIDE 50 MG: 50 INJECTION, SOLUTION INTRAVENOUS at 07:42

## 2024-02-14 RX ADMIN — KETOROLAC TROMETHAMINE 30 MG: 30 INJECTION, SOLUTION INTRAMUSCULAR at 07:56

## 2024-02-14 RX ADMIN — ROCURONIUM BROMIDE 10 MG: 50 INJECTION, SOLUTION INTRAVENOUS at 08:54

## 2024-02-14 RX ADMIN — SODIUM CHLORIDE, SODIUM LACTATE, POTASSIUM CHLORIDE, AND CALCIUM CHLORIDE: 600; 310; 30; 20 INJECTION, SOLUTION INTRAVENOUS at 08:58

## 2024-02-14 RX ADMIN — MIDAZOLAM HYDROCHLORIDE 2 MG: 1 INJECTION, SOLUTION INTRAMUSCULAR; INTRAVENOUS at 07:40

## 2024-02-14 RX ADMIN — LIDOCAINE HYDROCHLORIDE 60 MG: 20 INJECTION, SOLUTION INFILTRATION; PERINEURAL at 07:42

## 2024-02-14 RX ADMIN — SUGAMMADEX 200 MG: 100 INJECTION, SOLUTION INTRAVENOUS at 09:22

## 2024-02-14 RX ADMIN — SODIUM CHLORIDE, SODIUM LACTATE, POTASSIUM CHLORIDE, AND CALCIUM CHLORIDE: 600; 310; 30; 20 INJECTION, SOLUTION INTRAVENOUS at 09:31

## 2024-02-14 RX ADMIN — Medication 50 MG: at 07:50

## 2024-02-14 RX ADMIN — CLINDAMYCIN PHOSPHATE 900 MG: 900 INJECTION, SOLUTION INTRAVENOUS at 07:33

## 2024-02-14 RX ADMIN — PROPOFOL 200 MG: 10 INJECTION, EMULSION INTRAVENOUS at 07:42

## 2024-02-14 ASSESSMENT — ACTIVITIES OF DAILY LIVING (ADL)
ADLS_ACUITY_SCORE: 31

## 2024-02-14 NOTE — OR NURSING
PACU Respiratory Event Documentation     1) Episodes of Apnea greater than or equal to 10 seconds: no    2) Bradypnea - less than 8 breaths per minute: no    3) Pain score on 0 to 10 scale: 0    4) Pain-sedation mismatch (yes or no): no    5) Repeated 02 desaturation less than 90% (yes or no): yes on 2 liters naSAL CANNULA    Anesthesia notified? (yes or no): no    Any of the above events occuring repeatedly in separate 30 minute intervals may be considered recurrent PACU respiratory events.

## 2024-02-14 NOTE — INTERVAL H&P NOTE
I saw and examined Stas Harman.  I have reviewed the history and physical and find no changes to the patient's medical status or condition with the exceptions noted below.       Medhat Mackey MD   7:29 AM 2/14/2024

## 2024-02-14 NOTE — ANESTHESIA PREPROCEDURE EVALUATION
Anesthesia Pre-Procedure Evaluation    Patient: Stas Harman   MRN: [id number] : 1955        Procedure : Procedure(s):  HERNIORRHAPHY, UMBILICAL, OPEN  HERNIORRHAPHY, INGUINAL, LAPAROSCOPIC          Past Medical History:   Diagnosis Date    Affections of shoulder region     2014    Arthropathy of shoulder region     2014    Other postprocedural states     12/15/2014    Sprain of rotator cuff capsule     2014      Past Surgical History:   Procedure Laterality Date    COLONOSCOPY  2007    f/u 10 years    COLONOSCOPY N/A 2022    1 small tubular adenoma, 5 year follow up, 3/1/2027    EXTRACTION(S) DENTAL      No Comments Provided    OTHER SURGICAL HISTORY      12/15/2014,[id number],OTHER,Right    RELEASE CARPAL TUNNEL      No Comments Provided      Allergies   Allergen Reactions    Penicillins Rash     Childhood rash      Social History     Tobacco Use    Smoking status: Never    Smokeless tobacco: Never   Substance Use Topics    Alcohol use: No      Wt Readings from Last 1 Encounters:   24 93.9 kg (207 lb)        Anesthesia Evaluation   Pt has had prior anesthetic.     No history of anesthetic complications       ROS/MED HX  ENT/Pulmonary:  - neg pulmonary ROS     Neurologic:  - neg neurologic ROS     Cardiovascular:     (+) Dyslipidemia - -   -  - -                                      METS/Exercise Tolerance: >4 METS    Hematologic:  - neg hematologic  ROS     Musculoskeletal: Comment: Shoulder Pain  (+)  arthritis,             GI/Hepatic:  - neg GI/hepatic ROS     Renal/Genitourinary:     (+) renal disease, type: CRI, Pt does not require dialysis,           Endo:  - neg endo ROS     Psychiatric/Substance Use:  - neg psychiatric ROS     Infectious Disease:  - neg infectious disease ROS     Malignancy:  - neg malignancy ROS     Other:  - neg other ROS          Physical Exam    Airway        Mallampati: I   TM distance: > 3 FB   Neck ROM: full   Mouth opening: > 3  "cm    Respiratory Devices and Support         Dental       (+) Minor Abnormalities - some fillings, tiny chips      Cardiovascular   cardiovascular exam normal       Rhythm and rate: regular and normal     Pulmonary   pulmonary exam normal        breath sounds clear to auscultation           OUTSIDE LABS:  CBC:   Lab Results   Component Value Date    WBC 5.3 02/02/2024    WBC 5.5 02/01/2023    HGB 16.4 02/02/2024    HGB 16.3 02/01/2023    HCT 46.0 02/02/2024    HCT 47.3 02/01/2023     02/02/2024     02/01/2023     BMP:   Lab Results   Component Value Date     02/02/2024     02/01/2023    POTASSIUM 4.7 02/02/2024    POTASSIUM 4.7 02/01/2023    CHLORIDE 103 02/02/2024    CHLORIDE 101 02/01/2023    CO2 29 02/02/2024    CO2 29 02/01/2023    BUN 24.5 (H) 02/02/2024    BUN 23.8 (H) 02/01/2023    CR 1.14 02/02/2024    CR 1.11 02/01/2023     (H) 02/02/2024     (H) 02/01/2023     COAGS: No results found for: \"PTT\", \"INR\", \"FIBR\"  POC: No results found for: \"BGM\", \"HCG\", \"HCGS\"  HEPATIC:   Lab Results   Component Value Date    ALBUMIN 4.1 02/02/2024    PROTTOTAL 6.8 02/02/2024    ALT 48 02/02/2024    AST 42 02/02/2024    ALKPHOS 74 02/02/2024    BILITOTAL 1.3 (H) 02/02/2024     OTHER:   Lab Results   Component Value Date    A1C 5.2 02/02/2024    MELISSA 9.2 02/02/2024       Anesthesia Plan    ASA Status:  2    NPO Status:  NPO Appropriate    Anesthesia Type: General.     - Airway: ETT   Induction: Intravenous, Propofol.   Maintenance: Balanced.        Consents    Anesthesia Plan(s) and associated risks, benefits, and realistic alternatives discussed. Questions answered and patient/representative(s) expressed understanding.     - Discussed: Risks, Benefits and Alternatives for BOTH SEDATION and the PROCEDURE were discussed     - Discussed with:  Patient      - Extended Intubation/Ventilatory Support Discussed: No.      - Patient is DNR/DNI Status: No     Use of blood products discussed: No . " "    Postoperative Care    Pain management: IV analgesics.   PONV prophylaxis: Ondansetron (or other 5HT-3), Dexamethasone or Solumedrol     Comments:               ARABELLA Mendenhall CRNA    I have reviewed the pertinent notes and labs in the chart from the past 30 days and (re)examined the patient.  Any updates or changes from those notes are reflected in this note.             # Drug Induced Platelet Defect: home medication list includes an antiplatelet medication  # Overweight: Estimated body mass index is 28.87 kg/m  as calculated from the following:    Height as of this encounter: 1.803 m (5' 11\").    Weight as of this encounter: 93.9 kg (207 lb).      "

## 2024-02-14 NOTE — OP NOTE
PREOPERATIVE  DIAGNOSIS: left inguinal hernia, initial, reducible.  Initial, reducible umbilical hernia     POSTOPERATIVE DIAGNOSIS: same     PROCEDURE PERFORMED: Laparoscopic CARLY left inguinal hernia repair with mesh.,  Primary repair of umbilical hernia    COMPLICATIONS: none    ESTIMATED BLOOD LOSS: 15 mL     SURGEON:  Medhat Mackey MD MD     ASSISTANT: Circulator: Jami Choi RN  Scrub Person: Griselda Aguayo URVASHI  First Assistant: Swetha Padron RN  Assist Patient Positioning: Nina Reyes     ANESTHESIA: GeneralCRNA Independent: Montana Dsouza APRN CRNA     FAMILYPHYSICIAN: Bruno Waterman        INDICATION FOR THE PROCEDURE:  The patient is a 66 year old y.o. male with a symptomatic left inguinal hernia and umbilical hernia.        PROCEDURE: Stas Harman was brought to the operating room placed in supine position.    General anesthetic was applied and the patient was intubated. Diego catheter was placed. Patient was prepped and draped in usual sterile fashion.  Timeout was performed and everyone was in agreement to proceed.  The supraumbilical area was anesthetized with local anesthetic.  A 4cm transverse incision was made. The subcutaneous tissues were dissected down to the level of the fascia with cautery.  The medical hernia sac was identified and dissected free from the subcutaneous tissues.  The hernia sac was then lifted from the fascia and excised from the fascia.  At this point I was able to access the abdominal cavity.  The umbilical hernia defect measured approximately 1 cm.  A 12 mm port was placed easily into the umbilical hernia defect and the abdomen was insufflated.   A 5mm port was placed under direct vision on the right and left mid-abdomen positions under direct vision. Hook cautery was used to incise the peritoneum roughly 4 to 5 cm superior to the inguinal hernia.  Of note, this is a indirect inguinal hernia.  There is a minute, possible early indirect  hernia on the right side.  Of note, patient is asymptomatic on the right side.  The peritoneum on the left side was incised from the median umbilical ligament all the way to the ASIS.  Electrocautery was used to separate the peritoneum from the anterior abdominal wall.  This dissection was carried down to the level of the pubic tubercle medially, and down to the femoral vessels laterally.  The spermatic cord was identified and dissected to reveal all critical structures including the vas deferens and testicular artery. The hernia sack was identified and carefully dissected from the spermatic cord and reduced back in to the abdomen with little difficulty.    The posterior peritoneal reflection was swept down a little more to accommodate the 15 x 10 cm ProGrip, self fixating mesh.  The mesh was rolled up and placed inside the abdomen through the supraumbilical port under direct vision. The medial portion of the mesh was held over the pubic tubercle and the mesh was slowly rolled out laterally.  The mesh was pressed up to the anterior abdominal wall such that the final placement covered the internal inguinal ring, Hesselbach's triangle and the femoral space. The mesh appeared to be anterior to the peritoneal reflection and have good fixation to the anterior abdominal wall.  The abdomen was again inspected for bleeding and hemostasis was good.  The peritoneal defect was closed with running 3-0 V lock suture.  The lateral ports were removed under direct vision and the abdomen was desufflated.   The umbilical hernia defect was again palpated and this defect is felt to be small enough to repair primarily.  Thus, the umbilical hernia was primarily closed in a pants over vest fashion using #1 PDS suture x 2.  The subcutaneous tissues were reapproximated with 3-0 Vicryl suture.     Skin was closed with a running 4-0 Monocryl suture.  The skin was cleansed and dried and incisions were dressed with Mastisol, Steri-Strips and a  clean gauze dressing.  Both testicles were confirmed to be in the scrotum. At the end of the case all sponge and needle counts were correct.  The patient tolerated procedure well. They were extubated in the OR and was taken to the recovery room in good condition.        ELOISE Mackey MD

## 2024-02-14 NOTE — OR NURSING
Pepito has been discharged to home at 1212 via ambulatory accompanied by Candy Greene RN    Written discharge instructions were provided to patient.  Prescriptions were e-scribed.  Patient states their pain is sore, and denies any nausea or dizziness upon discharge.    Patient and adult caring for them verbalize understanding of discharge instructions including no driving until tomorrow and no longer taking narcotic pain medications - no operating mechanical equipment and no making any important decisions.They understand reason for discharge, and necessary follow-up appointments.      Debi Zhu RN on 2/14/2024 at 12:20 PM

## 2024-02-14 NOTE — ANESTHESIA CARE TRANSFER NOTE
Patient: Stas Harman    Procedure: Procedure(s):  HERNIORRHAPHY, UMBILICAL, OPEN  HERNIORRHAPHY, INGUINAL, LAPAROSCOPIC WITH MESH       Diagnosis: Umbilical hernia without obstruction and without gangrene [K42.9]  Inguinal hernia of left side without obstruction or gangrene [K40.90]  Diagnosis Additional Information: No value filed.    Anesthesia Type:   General     Note:    Oropharynx: oropharynx clear of all foreign objects and spontaneously breathing  Level of Consciousness: drowsy  Oxygen Supplementation: face mask  Level of Supplemental Oxygen (L/min / FiO2): 8  Independent Airway: airway patency satisfactory and stable  Dentition: dentition unchanged  Vital Signs Stable: post-procedure vital signs reviewed and stable  Report to RN Given: handoff report given  Patient transferred to: PACU    Handoff Report: Identifed the Patient, Identified the Reponsible Provider, Reviewed the pertinent medical history, Discussed the surgical course, Reviewed Intra-OP anesthesia mangement and issues during anesthesia, Set expectations for post-procedure period and Allowed opportunity for questions and acknowledgement of understanding      Vitals:  Vitals Value Taken Time   /77 02/14/24 0939   Temp 98.2  F (36.8  C) 02/14/24 0939   Pulse 75 02/14/24 0942   Resp 14 02/14/24 0942   SpO2 94 % 02/14/24 0942   Vitals shown include unfiled device data.    Electronically Signed By: ARABELLA Mendenhall CRNA  February 14, 2024  9:43 AM

## 2024-02-14 NOTE — ANESTHESIA POSTPROCEDURE EVALUATION
Patient: Stas Harman    Procedure: Procedure(s):  HERNIORRHAPHY, UMBILICAL, OPEN  HERNIORRHAPHY, INGUINAL, LAPAROSCOPIC WITH MESH       Anesthesia Type:  General    Note:  Disposition: Outpatient   Postop Pain Control: Uneventful            Sign Out: Well controlled pain   PONV: No   Neuro/Psych: Uneventful            Sign Out: Acceptable/Baseline neuro status   Airway/Respiratory: Uneventful            Sign Out: Acceptable/Baseline resp. status   CV/Hemodynamics: Uneventful            Sign Out: Acceptable CV status; No obvious hypovolemia; No obvious fluid overload   Other NRE: NONE   DID A NON-ROUTINE EVENT OCCUR?            Last vitals:  Vitals Value Taken Time   /74 02/14/24 1015   Temp 97.4  F (36.3  C) 02/14/24 1010   Pulse 79 02/14/24 1018   Resp 6 02/14/24 1019   SpO2 90 % 02/14/24 1019   Vitals shown include unfiled device data.    Electronically Signed By: ARABELLA Mendenhall CRNA  February 14, 2024  10:47 AM

## 2024-02-14 NOTE — DISCHARGE INSTRUCTIONS
[facility]  Adult Discharge Orders & Instructions      For 24 hours after surgery:  Get plenty of rest.  A responsible adult must stay with you for at least 24 hours after you leave the hospital.   You may feel lightheaded.  IF so, sit for a few minutes before standing.  Have someone help you get up.   You may have a slight fever. Call the doctor if your fever is over 101 F (38.3 C) (taken under the tongue) or lasts longer than 24 hours.  You may have a dry mouth, a sore throat, muscle aches or trouble sleeping.  These should go away after 24 hours.  Do not make important or legal decisions.  6.   Do not drive or use heavy equipment.  If you have weakness or tingling, don't drive or use heavy equipment until this feeling goes away.  To contact a doctor, call    [phone]______________

## 2024-02-29 ENCOUNTER — OFFICE VISIT (OUTPATIENT)
Dept: SURGERY | Facility: OTHER | Age: 69
End: 2024-02-29
Attending: SURGERY
Payer: COMMERCIAL

## 2024-02-29 VITALS
WEIGHT: 211.2 LBS | HEART RATE: 74 BPM | RESPIRATION RATE: 16 BRPM | OXYGEN SATURATION: 93 % | BODY MASS INDEX: 28.61 KG/M2 | DIASTOLIC BLOOD PRESSURE: 80 MMHG | SYSTOLIC BLOOD PRESSURE: 126 MMHG | TEMPERATURE: 96.9 F | HEIGHT: 72 IN

## 2024-02-29 DIAGNOSIS — Z98.890 POSTOPERATIVE STATE: Primary | ICD-10-CM

## 2024-02-29 PROCEDURE — G0463 HOSPITAL OUTPT CLINIC VISIT: HCPCS

## 2024-02-29 PROCEDURE — 99024 POSTOP FOLLOW-UP VISIT: CPT | Performed by: SURGERY

## 2024-02-29 NOTE — PROGRESS NOTES
"Stas Harman presents to clinic for follow-up after undergoing laparoscopic left inguinal hernia repair with mesh and primary repair of umbilical hernia.  Patient states doing well.  Denies bruising or swelling.  Incisions are healing well.  Had some swelling in the left groin that is improved.  Denies urinary problems.  Tolerating regular diet and bowel movements are normal.      /80 (BP Location: Right arm, Patient Position: Sitting, Cuff Size: Adult Regular)   Pulse 74   Temp 96.9  F (36.1  C) (Temporal)   Resp 16   Ht 1.816 m (5' 11.5\")   Wt 95.8 kg (211 lb 3.2 oz)   SpO2 93%   BMI 29.05 kg/m      Incisions are clean, dry, intact.  No evidence of recurrent hernia at the umbilicus or in the groin.  No bruising.      Stas Harman is a 69-year-old male status post laparoscopic left inguinal hernia repair with mesh and primary repair of umbilical hernia.  Patient is recovering as expected and there are no apparent surgical complications.  Patient is encouraged to slowly work back into normal activity over the next 2 weeks and lifting restrictions are then lifted completely at the end of 4-week period from surgery.  Follow-up in clinic with concerns.      Medhat Mackey MD   "

## 2024-02-29 NOTE — NURSING NOTE
"Chief Complaint   Patient presents with    Post-Op - General Surgery     Umbilical hernia       Initial /80 (BP Location: Right arm, Patient Position: Sitting, Cuff Size: Adult Regular)   Pulse 74   Temp 96.9  F (36.1  C) (Temporal)   Resp 16   Ht 1.816 m (5' 11.5\")   Wt 95.8 kg (211 lb 3.2 oz)   SpO2 93%   BMI 29.05 kg/m   Estimated body mass index is 29.05 kg/m  as calculated from the following:    Height as of this encounter: 1.816 m (5' 11.5\").    Weight as of this encounter: 95.8 kg (211 lb 3.2 oz).    Medication Review: complete    The next two questions are to help us understand your food security.  If you are feeling you need any assistance in this area, we have resources available to support you today.          2/2/2024   SDOH- Food Insecurity   Within the past 12 months, did you worry that your food would run out before you got money to buy more? N   Within the past 12 months, did the food you bought just not last and you didn t have money to get more? N     Health Care Directive:  Patient does not have a Health Care Directive or Living Will: Discussed advance care planning with patient; however, patient declined at this time.    Kelly Shaikh LPN      "

## 2024-08-07 ENCOUNTER — TELEPHONE (OUTPATIENT)
Dept: FAMILY MEDICINE | Facility: OTHER | Age: 69
End: 2024-08-07
Payer: COMMERCIAL

## 2024-08-07 DIAGNOSIS — E78.2 MIXED HYPERLIPIDEMIA: ICD-10-CM

## 2024-08-07 RX ORDER — ATORVASTATIN CALCIUM 20 MG/1
20 TABLET, FILM COATED ORAL DAILY
Qty: 100 TABLET | Refills: 1 | Status: SHIPPED | OUTPATIENT
Start: 2024-08-07

## 2024-08-07 NOTE — TELEPHONE ENCOUNTER
Patient has [facility] coverage and with this insurance plan, the patient is eligible to receive certain prescriptions as a 100-day supply at the 90-day supply cost.      Prescriptions updated to 100-day supply: nguyễn Moncada, PharmD, [facility]  Population Health Pharmacist  [phone]

## 2024-08-12 ENCOUNTER — TRANSFERRED RECORDS (OUTPATIENT)
Dept: HEALTH INFORMATION MANAGEMENT | Facility: OTHER | Age: 69
End: 2024-08-12
Payer: COMMERCIAL

## 2025-01-25 DIAGNOSIS — E78.2 MIXED HYPERLIPIDEMIA: ICD-10-CM

## 2025-01-27 RX ORDER — ATORVASTATIN CALCIUM 20 MG/1
20 TABLET, FILM COATED ORAL DAILY
Qty: 100 TABLET | Refills: 0 | Status: SHIPPED | OUTPATIENT
Start: 2025-01-27

## 2025-01-27 NOTE — TELEPHONE ENCOUNTER
[id number] sent Rx request for the following:      Requested Prescriptions   Pending Prescriptions Disp Refills    atorvastatin (LIPITOR) 20 MG tablet [Pharmacy Med Name: ATORVASTATIN 20MG TABLET] 100 tablet 1     Sig: TAKE 1 TABLET (20 MG) BY MOUTH DAILY       Antihyperlipidemic agents Failed - 1/27/2025  9:07 AM        Failed - Recent (12 mo) or future (90 days) visit within the authorizing provider's specialty     The patient must have completed an in-person or virtual visit within the past 12 months or has a future visit scheduled within the next 90 days with the authorizing provider s specialty.  Urgent care and e-visits do not qualify as an office visit for this protocol.       Last Prescription Date:   08/07/24  Last Fill Qty/Refills:         100, R-1    Last Office Visit:              02/02/24 (Sophia)   Future Office visit:             Next 5 appointments (look out 90 days)      Feb 06, 2025 9:20 AM  (Arrive by 9:05 AM)  PHYSICAL with Bo Cortes MD  [facility] [address]  [place] [address]  [phone]             Prescription refilled per RN Medication Refill Policy.................... Jena Tripp RN ....................  1/27/2025   9:11 AM

## 2025-01-28 ENCOUNTER — DOCUMENTATION ONLY (OUTPATIENT)
Dept: INTERNAL MEDICINE | Facility: OTHER | Age: 70
End: 2025-01-28
Payer: COMMERCIAL

## 2025-01-28 DIAGNOSIS — N18.2 CKD (CHRONIC KIDNEY DISEASE) STAGE 2, GFR 60-89 ML/MIN: ICD-10-CM

## 2025-01-28 DIAGNOSIS — Z12.5 ENCOUNTER FOR SCREENING FOR MALIGNANT NEOPLASM OF PROSTATE: Primary | ICD-10-CM

## 2025-01-28 DIAGNOSIS — E78.2 MIXED HYPERLIPIDEMIA: ICD-10-CM

## 2025-01-28 NOTE — PROGRESS NOTES
Stas Harman has an upcoming lab appointment and there are no orders available. Please review and place future orders, as appropriate.    Angelita Leos

## 2025-02-05 DIAGNOSIS — N18.2 CKD (CHRONIC KIDNEY DISEASE) STAGE 2, GFR 60-89 ML/MIN: Primary | ICD-10-CM

## 2025-02-05 PROBLEM — K42.9 UMBILICAL HERNIA WITHOUT OBSTRUCTION AND WITHOUT GANGRENE: Status: RESOLVED | Noted: 2024-02-02 | Resolved: 2025-02-05

## 2025-02-06 ENCOUNTER — LAB (OUTPATIENT)
Dept: LAB | Facility: OTHER | Age: 70
End: 2025-02-06
Attending: INTERNAL MEDICINE
Payer: MEDICARE

## 2025-02-06 ENCOUNTER — OFFICE VISIT (OUTPATIENT)
Dept: INTERNAL MEDICINE | Facility: OTHER | Age: 70
End: 2025-02-06
Attending: INTERNAL MEDICINE
Payer: MEDICARE

## 2025-02-06 VITALS
SYSTOLIC BLOOD PRESSURE: 130 MMHG | WEIGHT: 213 LBS | HEART RATE: 74 BPM | HEIGHT: 71 IN | OXYGEN SATURATION: 94 % | TEMPERATURE: 96.6 F | RESPIRATION RATE: 17 BRPM | BODY MASS INDEX: 29.82 KG/M2 | DIASTOLIC BLOOD PRESSURE: 78 MMHG

## 2025-02-06 DIAGNOSIS — N18.2 CKD (CHRONIC KIDNEY DISEASE) STAGE 2, GFR 60-89 ML/MIN: ICD-10-CM

## 2025-02-06 DIAGNOSIS — E66.3 OVERWEIGHT (BMI 25.0-29.9): ICD-10-CM

## 2025-02-06 DIAGNOSIS — Z00.00 MEDICARE ANNUAL WELLNESS VISIT, SUBSEQUENT: Primary | ICD-10-CM

## 2025-02-06 DIAGNOSIS — Z71.85 VACCINE COUNSELING: ICD-10-CM

## 2025-02-06 DIAGNOSIS — M54.2 NECK PAIN ON RIGHT SIDE: ICD-10-CM

## 2025-02-06 DIAGNOSIS — E78.2 MIXED HYPERLIPIDEMIA: ICD-10-CM

## 2025-02-06 DIAGNOSIS — Z12.5 ENCOUNTER FOR SCREENING FOR MALIGNANT NEOPLASM OF PROSTATE: ICD-10-CM

## 2025-02-06 LAB
ALBUMIN SERPL BCG-MCNC: 4 G/DL (ref 3.5–5.2)
ALBUMIN UR-MCNC: 30 MG/DL
ALP SERPL-CCNC: 91 U/L (ref 40–150)
ALT SERPL W P-5'-P-CCNC: 39 U/L (ref 0–70)
ANION GAP SERPL CALCULATED.3IONS-SCNC: 9 MMOL/L (ref 7–15)
APPEARANCE UR: CLEAR
AST SERPL W P-5'-P-CCNC: 30 U/L (ref 0–45)
BASOPHILS # BLD AUTO: 0 10E3/UL (ref 0–0.2)
BASOPHILS NFR BLD AUTO: 0 %
BILIRUB SERPL-MCNC: 1 MG/DL
BILIRUB UR QL STRIP: NEGATIVE
BUN SERPL-MCNC: 15.2 MG/DL (ref 8–23)
CALCIUM SERPL-MCNC: 9 MG/DL (ref 8.8–10.4)
CHLORIDE SERPL-SCNC: 101 MMOL/L (ref 98–107)
CHOLEST SERPL-MCNC: 152 MG/DL
COLOR UR AUTO: YELLOW
CREAT SERPL-MCNC: 1.17 MG/DL (ref 0.67–1.17)
CREAT UR-MCNC: 215.9 MG/DL
EGFRCR SERPLBLD CKD-EPI 2021: 68 ML/MIN/1.73M2
EOSINOPHIL # BLD AUTO: 0.3 10E3/UL (ref 0–0.7)
EOSINOPHIL NFR BLD AUTO: 4 %
ERYTHROCYTE [DISTWIDTH] IN BLOOD BY AUTOMATED COUNT: 12.2 % (ref 10–15)
FASTING STATUS PATIENT QL REPORTED: YES
FASTING STATUS PATIENT QL REPORTED: YES
GLUCOSE SERPL-MCNC: 107 MG/DL (ref 70–99)
GLUCOSE UR STRIP-MCNC: NEGATIVE MG/DL
HCO3 SERPL-SCNC: 28 MMOL/L (ref 22–29)
HCT VFR BLD AUTO: 46.9 % (ref 40–53)
HDLC SERPL-MCNC: 41 MG/DL
HGB BLD-MCNC: 16.5 G/DL (ref 13.3–17.7)
HGB UR QL STRIP: NEGATIVE
IMM GRANULOCYTES # BLD: 0 10E3/UL
IMM GRANULOCYTES NFR BLD: 0 %
KETONES UR STRIP-MCNC: NEGATIVE MG/DL
LDLC SERPL CALC-MCNC: 87 MG/DL
LEUKOCYTE ESTERASE UR QL STRIP: NEGATIVE
LYMPHOCYTES # BLD AUTO: 2.5 10E3/UL (ref 0.8–5.3)
LYMPHOCYTES NFR BLD AUTO: 36 %
MCH RBC QN AUTO: 29.5 PG (ref 26.5–33)
MCHC RBC AUTO-ENTMCNC: 35.2 G/DL (ref 31.5–36.5)
MCV RBC AUTO: 84 FL (ref 78–100)
MICROALBUMIN UR-MCNC: 113.1 MG/L
MICROALBUMIN/CREAT UR: 52.39 MG/G CR (ref 0–17)
MONOCYTES # BLD AUTO: 0.6 10E3/UL (ref 0–1.3)
MONOCYTES NFR BLD AUTO: 8 %
MUCOUS THREADS #/AREA URNS LPF: PRESENT /LPF
NEUTROPHILS # BLD AUTO: 3.4 10E3/UL (ref 1.6–8.3)
NEUTROPHILS NFR BLD AUTO: 51 %
NITRATE UR QL: NEGATIVE
NONHDLC SERPL-MCNC: 111 MG/DL
NRBC # BLD AUTO: 0 10E3/UL
NRBC BLD AUTO-RTO: 0 /100
PH UR STRIP: 5.5 [PH] (ref 5–9)
PLATELET # BLD AUTO: 207 10E3/UL (ref 150–450)
POTASSIUM SERPL-SCNC: 4.6 MMOL/L (ref 3.4–5.3)
PROT SERPL-MCNC: 6.9 G/DL (ref 6.4–8.3)
PSA SERPL DL<=0.01 NG/ML-MCNC: 2.5 NG/ML (ref 0–4.5)
RBC # BLD AUTO: 5.59 10E6/UL (ref 4.4–5.9)
RBC URINE: <1 /HPF
SODIUM SERPL-SCNC: 138 MMOL/L (ref 135–145)
SP GR UR STRIP: 1.02 (ref 1–1.03)
TRIGL SERPL-MCNC: 118 MG/DL
UROBILINOGEN UR STRIP-MCNC: NORMAL MG/DL
WBC # BLD AUTO: 6.8 10E3/UL (ref 4–11)
WBC URINE: 3 /HPF

## 2025-02-06 PROCEDURE — 85025 COMPLETE CBC W/AUTO DIFF WBC: CPT | Mod: ZL

## 2025-02-06 PROCEDURE — 81001 URINALYSIS AUTO W/SCOPE: CPT | Mod: ZL

## 2025-02-06 PROCEDURE — 80053 COMPREHEN METABOLIC PANEL: CPT | Mod: ZL

## 2025-02-06 PROCEDURE — 82043 UR ALBUMIN QUANTITATIVE: CPT | Mod: ZL

## 2025-02-06 PROCEDURE — 36415 COLL VENOUS BLD VENIPUNCTURE: CPT | Mod: ZL

## 2025-02-06 PROCEDURE — 80061 LIPID PANEL: CPT | Mod: ZL

## 2025-02-06 PROCEDURE — G0103 PSA SCREENING: HCPCS | Mod: ZL

## 2025-02-06 RX ORDER — ATORVASTATIN CALCIUM 20 MG/1
20 TABLET, FILM COATED ORAL DAILY
Qty: 100 TABLET | Refills: 4 | Status: SHIPPED | OUTPATIENT
Start: 2025-02-06

## 2025-02-06 SDOH — HEALTH STABILITY: PHYSICAL HEALTH: ON AVERAGE, HOW MANY DAYS PER WEEK DO YOU ENGAGE IN MODERATE TO STRENUOUS EXERCISE (LIKE A BRISK WALK)?: 5 DAYS

## 2025-02-06 ASSESSMENT — ENCOUNTER SYMPTOMS
VOMITING: 0
DYSURIA: 0
CONFUSION: 0
AGITATION: 0
BRUISES/BLEEDS EASILY: 0
WHEEZING: 0
FEVER: 0
ABDOMINAL PAIN: 0
HEMATURIA: 0
ARTHRALGIAS: 0
LIGHT-HEADEDNESS: 0
NAUSEA: 0
WOUND: 0
CHILLS: 0
DIARRHEA: 0
COUGH: 0
HEARTBURN: 1
DIZZINESS: 0
NECK PAIN: 1
SHORTNESS OF BREATH: 0
MYALGIAS: 1

## 2025-02-06 ASSESSMENT — PAIN SCALES - GENERAL: PAINLEVEL_OUTOF10: MILD PAIN (1)

## 2025-02-06 ASSESSMENT — SOCIAL DETERMINANTS OF HEALTH (SDOH): HOW OFTEN DO YOU GET TOGETHER WITH FRIENDS OR RELATIVES?: ONCE A WEEK

## 2025-02-06 NOTE — NURSING NOTE
"Chief Complaint   Patient presents with    Medicare Visit       Initial /78   Pulse 74   Temp (!) 96.6  F (35.9  C)   Resp 17   Ht 1.803 m (5' 11\")   Wt 96.6 kg (213 lb)   SpO2 94%   BMI 29.71 kg/m   Estimated body mass index is 29.71 kg/m  as calculated from the following:    Height as of this encounter: 1.803 m (5' 11\").    Weight as of this encounter: 96.6 kg (213 lb).  Medication Reconciliation: complete    FOOD SECURITY SCREENING QUESTIONS  Hunger Vital Signs:  Within the past 12 months we worried whether our food would run out before we got money to buy more. Never  Within the past 12 months the food we bought just didn't last and we didn't have money to get more. Never  Clau Nails LPN 2/6/2025 9:12 AM       Advance care directive on file? No   Advance care directive provided to patient? no       Clau Nails LPN    "

## 2025-02-06 NOTE — PATIENT INSTRUCTIONS
Blood pressure is well controlled.     Medications refilled.     Labs are relatively stable.     Slight elevation of urine protein - improved from 4 years ago.....    -- We could consider starting low-dose of Invokana or Jardiance, to help reduce urine protein,  and hopefully help improve kidney health.      To help with weight loss and improve blood sugar control....    -- Try to avoid Carbohydrates as much as possible -- breads, pasta, baked goods, cakes, oatmeal, cold cereal, potatoes.   -- Eat more lean meats, proteins, eggs, nuts, vegetables.    -- Start or Continue regular daily exercise.     Get out and exercise, bike ride, walk for 10 to 15 minutes after each meal -- this can significantly lowers the spike in blood sugar after eating.         Wt Readings from Last 5 Encounters:   02/06/25 96.6 kg (213 lb)   02/29/24 95.8 kg (211 lb 3.2 oz)   02/14/24 93.9 kg (207 lb)   02/08/24 94.3 kg (207 lb 12.8 oz)   02/02/24 94.9 kg (209 lb 3.2 oz)            Sore Muscles / Muscle Spasm Instructions  Heat packs to affected areas 20 min on, 20 min off as tolerated.  Take Aspirin or ibuprofen and tylenol as needed for mild-moderate pain. (If able to use these).       Consider Topical muscle rubs - Icy Hot, Aspercreme (Advanced) with or without lidocaine, Biofreeze, Lidocaine gel, Diclofenac gel / Voltaren gel, Beto Emu from [employer], or [employer] spray.  --- Consider red box from [employer] -- Equate Ultra Strength Pain Relief Cream, 4 oz        Return in approximately 1 year, or sooner as needed for follow-up with Dr. Cortes.  - Annual Follow-up / Physical - Medicare Annual Wellness Visit     Clinic : [phone]  Appointment line: [phone]

## 2025-02-06 NOTE — PROGRESS NOTES
Assessment & Plan     ICD-10-CM    1. Medicare annual wellness visit, subsequent  Z00.00       2. Vaccine counseling  Z71.85       3. CKD (chronic kidney disease) stage 2, GFR 60-89 ml/min  N18.2       4. Mixed hyperlipidemia  E78.2 atorvastatin (LIPITOR) 20 MG tablet      5. Overweight (BMI 25.0-29.9)  E66.3       6. Neck pain on right side  M54.2          Patient presents for Medicare annual wellness visit as well as follow-up multiple issues.    Elevated urine protein levels, has stage II chronic kidney disease.  We discussed possibility of starting Invokana or Jardiance or similar medication.  He would like to look into this.  He is concerned about the cost and potential side effects.  We did discuss the  need to avoid these medications if there is any personal or family history of pancreatic cancer/thyroid cancer.  He plans to look into it and consider use.    Otherwise encourage NSAID avoidance.  Does rarely take aspirin if needed for neck pain.    Cholesterol levels are at goal.  See below.  Doing well with Lipitor.    Overweight, plans to keep working on healthy dietary choices, continues with regular exercise.  Goal is to try to lose a couple pounds.    Right-sided neck pain.  Has some soft tissue pain on palpation.  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  Advised that this is likely cause some arthritic changes in the neck.  Declines need for x-rays at this time.  Continue with conservative management.  Follow-up as needed.    MIXED HYPERLIPIDEMIA.  Ongoing. LDL is at goal: Yes. Triglycerides are at goal: Yes.    Medication side effects reported: None.   Continue current medications for now. Medication list reviewed/updated. Refills completed as needed.  Recent Labs   Lab Test 02/06/25  0816 02/02/24  0805   CHOL 152 152   HDL 41 43   LDL 87 89   TRIG 118 101        Chronic Kidney Disease, Stage 2 (GFR 60-89), chronic, ongoing.  Kidney  function had been slowly declining.  Encourage NSAID avoidance.    Recent Labs   Lab Test 02/06/25  0816 02/02/24  0805   CR 1.17 1.14   GFRESTIMATED 68 70        Vaccine counseling completed.  Encourage routine / annual vaccinations.    The longitudinal plan of care for the diagnosis(es)/condition(s) as documented were addressed during this visit. Due to the added complexity in care, I will continue to support Pepito in the subsequent management and with ongoing continuity of care.      Results for orders placed or performed in visit on 02/06/25   Comprehensive metabolic panel     Status: Abnormal   Result Value Ref Range    Sodium 138 135 - 145 mmol/L    Potassium 4.6 3.4 - 5.3 mmol/L    Carbon Dioxide (CO2) 28 22 - 29 mmol/L    Anion Gap 9 7 - 15 mmol/L    Urea Nitrogen 15.2 8.0 - 23.0 mg/dL    Creatinine 1.17 0.67 - 1.17 mg/dL    GFR Estimate 68 >60 mL/min/1.73m2    Calcium 9.0 8.8 - 10.4 mg/dL    Chloride 101 98 - 107 mmol/L    Glucose 107 (H) 70 - 99 mg/dL    Alkaline Phosphatase 91 40 - 150 U/L    AST 30 0 - 45 U/L    ALT 39 0 - 70 U/L    Protein Total 6.9 6.4 - 8.3 g/dL    Albumin 4.0 3.5 - 5.2 g/dL    Bilirubin Total 1.0 <=1.2 mg/dL    Patient Fasting > 8hrs? Yes    Lipid Profile     Status: None   Result Value Ref Range    Cholesterol 152 <200 mg/dL    Triglycerides 118 <150 mg/dL    Direct Measure HDL 41 >=40 mg/dL    LDL Cholesterol Calculated 87 <100 mg/dL    Non HDL Cholesterol 111 <130 mg/dL    Patient Fasting > 8hrs? Yes     Narrative    Cholesterol  Desirable: < 200 mg/dL  Borderline High: 200 - 239 mg/dL  High: >= 240 mg/dL    Triglycerides  Normal: < 150 mg/dL  Borderline High: 150 - 199 mg/dL  High: 200-499 mg/dL  Very High: >= 500 mg/dL    Direct Measure HDL  Female: >= 50 mg/dL   Male: >= 40 mg/dL    LDL Cholesterol  Desirable: < 100 mg/dL  Above Desirable: 100 - 129 mg/dL   Borderline High: 130 - 159 mg/dL   High:  160 - 189 mg/dL   Very High: >= 190 mg/dL    Non HDL Cholesterol  Desirable: <  130 mg/dL  Above Desirable: 130 - 159 mg/dL  Borderline High: 160 - 189 mg/dL  High: 190 - 219 mg/dL  Very High: >= 220 mg/dL   Prostate Specific Antigen Screen     Status: Normal   Result Value Ref Range    Prostate Specific Antigen Screen 2.50 0.00 - 4.50 ng/mL    Narrative    This result is obtained using the Roche Elecsys total PSA method on the janey e601 immunoassay analyzer, which is an ultrasensitive method. Results obtained with different assay methods or kits cannot be used interchangeably.  This test is intended for initial prostate cancer screening. PSA values exceeding the age-specific limits are suspicious for prostate disease, but additional testing, such as prostate biopsy, is needed to diagnose prostate pathology. The American Cancer Society recommends annual examination with digital rectal examination and serum PSA beginning at age 50 and for men with a life expectancy of at least 10 years after detection of prostate cancer. For men in high-risk groups, such as  Americans or men with a first-degree relative diagnosed at a younger age, testing should begin at a younger age. It is generally recommended that information be provided to patients about the benefits and limitations of testing and treatment so they can make informed decisions.   Albumin Random Urine Quantitative with Creat Ratio     Status: Abnormal   Result Value Ref Range    Creatinine Urine mg/dL 215.9 mg/dL    Albumin Urine mg/L 113.1 mg/L    Albumin Urine mg/g Cr 52.39 (H) 0.00 - 17.00 mg/g Cr   UA with Microscopic reflex to Culture     Status: Abnormal    Specimen: Urine, Clean Catch   Result Value Ref Range    Color Urine Yellow Colorless, Straw, Light Yellow, Yellow    Appearance Urine Clear Clear    Glucose Urine Negative Negative mg/dL    Bilirubin Urine Negative Negative    Ketones Urine Negative Negative mg/dL    Specific Gravity Urine 1.020 1.000 - 1.030    Blood Urine Negative Negative    pH Urine 5.5 5.0 - 9.0     Protein Albumin Urine 30 (A) Negative mg/dL    Urobilinogen Urine Normal Normal, 2.0 mg/dL    Nitrite Urine Negative Negative    Leukocyte Esterase Urine Negative Negative    Mucus Urine Present (A) None Seen /LPF    RBC Urine <1 <=2 /HPF    WBC Urine 3 <=5 /HPF    Narrative    Urine Culture not indicated   CBC with platelets and differential     Status: None   Result Value Ref Range    WBC Count 6.8 4.0 - 11.0 10e3/uL    RBC Count 5.59 4.40 - 5.90 10e6/uL    Hemoglobin 16.5 13.3 - 17.7 g/dL    Hematocrit 46.9 40.0 - 53.0 %    MCV 84 78 - 100 fL    MCH 29.5 26.5 - 33.0 pg    MCHC 35.2 31.5 - 36.5 g/dL    RDW 12.2 10.0 - 15.0 %    Platelet Count 207 150 - 450 10e3/uL    % Neutrophils 51 %    % Lymphocytes 36 %    % Monocytes 8 %    % Eosinophils 4 %    % Basophils 0 %    % Immature Granulocytes 0 %    NRBCs per 100 WBC 0 <1 /100    Absolute Neutrophils 3.4 1.6 - 8.3 10e3/uL    Absolute Lymphocytes 2.5 0.8 - 5.3 10e3/uL    Absolute Monocytes 0.6 0.0 - 1.3 10e3/uL    Absolute Eosinophils 0.3 0.0 - 0.7 10e3/uL    Absolute Basophils 0.0 0.0 - 0.2 10e3/uL    Absolute Immature Granulocytes 0.0 <=0.4 10e3/uL    Absolute NRBCs 0.0 10e3/uL   CBC with Platelets & Differential     Status: None    Narrative    The following orders were created for panel order CBC with Platelets & Differential.  Procedure                               Abnormality         Status                     ---------                               -----------         ------                     CBC with platelets and d...[[id number]]                      Final result                 Please view results for these tests on the individual orders.      CBC is normal.  Urinalysis with slight protein elevation but otherwise normal.  Urine microalbumin levels are 52.  This is slightly improved compared to a few years ago.  PSA is normal.  Cholesterol levels are at goal.  Random  glucose is slightly elevated at 107.  Liver enzymes normal.  Creatinine 1.17 with GFR  "68.  Potassium normal.           BMI  Estimated body mass index is 29.71 kg/m  as calculated from the following:    Height as of this encounter: 1.803 m (5' 11\").    Weight as of this encounter: 96.6 kg (213 lb).       Counseling  Appropriate preventive services were addressed with this patient via screening, questionnaire, or discussion as appropriate for fall prevention, nutrition, physical activity, Tobacco-use cessation, social engagement, weight loss and cognition.  Checklist reviewing preventive services available has been given to the patient.  Reviewed patient's diet, addressing concerns and/or questions.         Return in about 1 year (around 2/6/2026) for Annual Medicare Wellness Visit.    Review of Systems   Constitutional:  Negative for chills and fever.   HENT:  Negative for congestion and hearing loss.    Eyes:  Negative for visual disturbance.   Respiratory:  Negative for cough, shortness of breath and wheezing.    Cardiovascular:  Negative for chest pain.   Gastrointestinal:  Positive for heartburn. Negative for abdominal pain, diarrhea, nausea and vomiting.   Endocrine: Negative for cold intolerance and heat intolerance.   Genitourinary:  Negative for dysuria and hematuria.   Musculoskeletal:  Positive for myalgias and neck pain (Right side, neck muscles are sore at times, more difficult with neck rotation). Negative for arthralgias.   Skin:  Negative for rash and wound.   Allergic/Immunologic: Negative for immunocompromised state.   Neurological:  Negative for dizziness and light-headedness.   Hematological:  Does not bruise/bleed easily.   Psychiatric/Behavioral:  Negative for agitation and confusion.        Preventive Care Visit  [facility]  Bo Cortes MD, Internal Medicine  Feb 6, 2025        Subjective   Pepito is a 70 year old, presenting for the following:  Medicare Visit            HPI          Health Care Directive  Patient does not have a Health Care Directive: " Discussed advance care planning with patient; information given to patient to review.      2/6/2025   General Health   How would you rate your overall physical health? Good   Feel stress (tense, anxious, or unable to sleep) Not at all         2/6/2025   Nutrition   Diet: Regular (no restrictions)         2/6/2025   Exercise   Days per week of moderate/strenous exercise 5 days         2/6/2025   Social Factors   Frequency of gathering with friends or relatives Once a week   Worry food won't last until get money to buy more No   Food not last or not have enough money for food? No   Do you have housing? (Housing is defined as stable permanent housing and does not include staying ouside in a car, in a tent, in an abandoned building, in an overnight shelter, or couch-surfing.) Yes   Are you worried about losing your housing? No   Lack of transportation? No   Unable to get utilities (heat,electricity)? No         2/6/2025   Fall Risk   Fallen 2 or more times in the past year? Yes    Yes   Trouble with walking or balance? No    No       Multiple values from one day are sorted in reverse-chronological order           2/6/2025   Activities of Daily Living- Home Safety   Needs help with the following daily activites None of the above   Safety concerns in the home None of the above         2/6/2025   Dental   Dentist two times every year? Yes         2/6/2025   Hearing Screening   Hearing concerns? None of the above         2/6/2025   Driving Risk Screening   Patient/family members have concerns about driving No         2/6/2025   General Alertness/Fatigue Screening   Have you been more tired than usual lately? No         2/6/2025   Urinary Incontinence Screening   Bothered by leaking urine in past 6 months No            Today's PHQ-2 Score:       2/6/2025     9:05 AM   PHQ-2 ( 1999 Pfizer)   Q1: Little interest or pleasure in doing things 0   Q2: Feeling down, depressed or hopeless 0   PHQ-2 Score 0    Q1: Little interest or  pleasure in doing things Not at all   Q2: Feeling down, depressed or hopeless Not at all   PHQ-2 Score 0       Patient-reported           2/6/2025   Substance Use   Alcohol more than 3/day or more than 7/wk Not Applicable   Do you have a current opioid prescription? No   How severe/bad is pain from 1 to 10? 1/10   Do you use any other substances recreationally? No     Social History     Tobacco Use    Smoking status: Never     Passive exposure: Past    Smokeless tobacco: Never   Vaping Use    Vaping status: Never Used   Substance Use Topics    Alcohol use: No    Drug use: No           2/6/2025   AAA Screening   Family history of Abdominal Aortic Aneurysm (AAA)? Unsure   Last PSA:   Prostate Specific Antigen Screen   Date Value Ref Range Status   02/06/2025 2.50 0.00 - 4.50 ng/mL Final     ASCVD Risk   The 10-year ASCVD risk score (Bayron MCADASM, et al., 2019) is: 15.3%    Values used to calculate the score:      Age: 70 years      Sex: Male      Is Non- : No      Diabetic: No      Tobacco smoker: No      Systolic Blood Pressure: 130 mmHg      Is BP treated: No      HDL Cholesterol: 41 mg/dL      Total Cholesterol: 152 mg/dL            Reviewed and updated as needed this visit by Provider   Tobacco  Allergies  Meds  Problems  Med Hx  Surg Hx  Fam Hx     Sexual Activity            Current providers sharing in care for this patient include:  Patient Care Team:  Bo Cortes MD as PCP - General (Internal Medicine)  Bo Cortes MD as Assigned PCP  Medhat Mackey MD as Assigned Surgical Provider    The following health maintenance items are reviewed in Epic and correct as of today:  Health Maintenance   Topic Date Due    COVID-19 Vaccine (7 - 2024-25 season) 04/21/2025    MEDICARE ANNUAL WELLNESS VISIT  02/06/2026    BMP  02/06/2026    LIPID  02/06/2026    MICROALBUMIN  02/06/2026    FALL RISK ASSESSMENT  02/06/2026    COLORECTAL CANCER SCREENING  03/01/2027    GLUCOSE   "02/06/2028    ADVANCE CARE PLANNING  02/06/2030    DTAP/TDAP/TD IMMUNIZATION (2 - Td or Tdap) 07/06/2031    RSV VACCINE (1 - 1-dose 75+ series) 01/12/2032    HEPATITIS C SCREENING  Completed    PHQ-2 (once per calendar year)  Completed    Pneumococcal Vaccine: 50+ Years  Completed    URINALYSIS  Completed    ZOSTER IMMUNIZATION  Completed    HPV IMMUNIZATION  Aged Out    MENINGITIS IMMUNIZATION  Aged Out    INFLUENZA VACCINE  Discontinued            Objective    Exam  /78   Pulse 74   Temp (!) 96.6  F (35.9  C)   Resp 17   Ht 1.803 m (5' 11\")   Wt 96.6 kg (213 lb)   SpO2 94%   BMI 29.71 kg/m     Estimated body mass index is 29.71 kg/m  as calculated from the following:    Height as of this encounter: 1.803 m (5' 11\").    Weight as of this encounter: 96.6 kg (213 lb).    Physical Exam  Constitutional:       General: He is not in acute distress.     Appearance: Normal appearance. He is well-developed. He is not ill-appearing.   Eyes:      General: No scleral icterus.     Conjunctiva/sclera: Conjunctivae normal.   Neck:      Vascular: No carotid bruit.   Cardiovascular:      Rate and Rhythm: Normal rate and regular rhythm.      Pulses: Normal pulses.   Pulmonary:      Effort: Pulmonary effort is normal. No respiratory distress.      Breath sounds: No wheezing.   Abdominal:      Palpations: Abdomen is soft.      Tenderness: There is no abdominal tenderness.   Musculoskeletal:         General: Tenderness (Right side, neck muscles are sore at times, more difficult with neck rotation) present. No deformity.      Right lower leg: No edema.      Left lower leg: No edema.   Skin:     General: Skin is warm and dry.      Findings: No rash.   Neurological:      Mental Status: He is alert. Mental status is at baseline.      Cranial Nerves: No cranial nerve deficit.   Psychiatric:         Mood and Affect: Mood normal.         Behavior: Behavior normal.               2/6/2025   Mini Cog   Mini-Cog Not Completed (choose " reason) Patient declines       Patient declines, there are NO concerns for cognitive deficits.           Signed Electronically by: oB Cortes MD

## (undated) DEVICE — VERRES NEEDLE 120MM DISPOSABLE 12/BX

## (undated) DEVICE — SOL WATER 1500ML

## (undated) DEVICE — SUCTION MANIFOLD NEPTUNE 2 SYS 4 PORT 0702-020-000

## (undated) DEVICE — TUBING SUCTION 10'X3/16" N510

## (undated) DEVICE — ENDO TRAP POLYP E-TRAP 00711099

## (undated) DEVICE — SU VICRYL 3-0 SH 27" UND J416H

## (undated) DEVICE — ENDO TROCAR FIRST ENTRY KII FIOS Z-THRD 12X100MM CTF73

## (undated) DEVICE — GLOVE BIOGEL PI INDICATOR 8.0 LF 41680

## (undated) DEVICE — DRSG MEDIPORE 2X2 3/4" 3562

## (undated) DEVICE — SU MONOCRYL 4-0 PS-2 27" UND Y426H

## (undated) DEVICE — COVER LIGHT HANDLE LT-F02

## (undated) DEVICE — PACK LAPAROSCOPY LF SBA15LPFCA

## (undated) DEVICE — DRSG STERI STRIP 1/2X4" R1547

## (undated) DEVICE — SU VICRYL 0 CT-2 CR 8X18" J727D

## (undated) DEVICE — HEMOCLIP QUICKCLIP PRO OLYMPUS 230CM HX-202UR.B

## (undated) DEVICE — TUBING INSUFFLATOR W/FILTER OLYMPUS WA95005A

## (undated) DEVICE — CATH TRAY FOLEY SURESTEP 16FR W/URINE MTR STATLK LF A303416A

## (undated) DEVICE — PREP SKIN SCRUB TRAY 4461A

## (undated) DEVICE — DEVICE ENDO STITCH APPLIER 10MM 173016

## (undated) DEVICE — SLEEVE COMPRESSION SCD KNEE MED 74022

## (undated) DEVICE — DRSG TEGADERM 4X4 3/4" 1626W

## (undated) DEVICE — SU WND CLOSURE VLOC ABS 3-0 8" VLOCA308L

## (undated) DEVICE — SU VICRYL 0 UR-6 27" J603H

## (undated) DEVICE — ENDO TROCAR SLEEVE KII Z-THREADED 05X100MM CTS02

## (undated) DEVICE — ESU GROUND PAD ADULT W/CORD E7507

## (undated) DEVICE — SU PDS II 0 CT-2 27" Z334H

## (undated) DEVICE — PENCIL MEGADYNE TELESCOPING SMOKE EVACUATION 10 FT 251010J

## (undated) DEVICE — GLOVE PROTEXIS POWDER FREE SMT 7.5  2D72PT75X

## (undated) DEVICE — ENDO TROCAR SLEEVE KII 5X100MM CTR03

## (undated) DEVICE — SPONGE COTTON BALL PURE

## (undated) DEVICE — ENDO BRUSH CHANNEL MASTER CLEANING 2-4.2MM BW-412T

## (undated) DEVICE — BLADE CLIPPER 4406

## (undated) DEVICE — ESU HOLDER LAP INST DISP PURPLE LONG 330MM H-PRO-330

## (undated) DEVICE — ENDO SCOPE WARMER DUAL LAP TM500D

## (undated) DEVICE — ENDO SNARE POLYPECTOMY OVAL 10MM LOOP SD-240U-10

## (undated) DEVICE — ENDO KIT COMPLIANCE DYKENDOCMPLY

## (undated) DEVICE — ESU CORD MONOPOLAR 10'  E0510

## (undated) DEVICE — PACK MAJOR LAPAROTOMY LF SBA15MLFCA

## (undated) DEVICE — SU DERMABOND ADVANCED .7ML DNX12

## (undated) RX ORDER — DEXAMETHASONE SODIUM PHOSPHATE 4 MG/ML
INJECTION, SOLUTION INTRA-ARTICULAR; INTRALESIONAL; INTRAMUSCULAR; INTRAVENOUS; SOFT TISSUE
Status: DISPENSED
Start: 2024-02-14

## (undated) RX ORDER — CLINDAMYCIN PHOSPHATE 900 MG/50ML
INJECTION, SOLUTION INTRAVENOUS
Status: DISPENSED
Start: 2024-02-14

## (undated) RX ORDER — PROPOFOL 10 MG/ML
INJECTION, EMULSION INTRAVENOUS
Status: DISPENSED
Start: 2022-03-01

## (undated) RX ORDER — LIDOCAINE HYDROCHLORIDE 20 MG/ML
INJECTION, SOLUTION EPIDURAL; INFILTRATION; INTRACAUDAL; PERINEURAL
Status: DISPENSED
Start: 2022-03-01

## (undated) RX ORDER — ACETAMINOPHEN 325 MG/1
TABLET ORAL
Status: DISPENSED
Start: 2024-02-14

## (undated) RX ORDER — BUPIVACAINE HYDROCHLORIDE AND EPINEPHRINE 5; 5 MG/ML; UG/ML
INJECTION, SOLUTION EPIDURAL; INTRACAUDAL; PERINEURAL
Status: DISPENSED
Start: 2024-02-14

## (undated) RX ORDER — PROPOFOL 10 MG/ML
INJECTION, EMULSION INTRAVENOUS
Status: DISPENSED
Start: 2024-02-14

## (undated) RX ORDER — SODIUM CHLORIDE, SODIUM LACTATE, POTASSIUM CHLORIDE, CALCIUM CHLORIDE 600; 310; 30; 20 MG/100ML; MG/100ML; MG/100ML; MG/100ML
INJECTION, SOLUTION INTRAVENOUS
Status: DISPENSED
Start: 2024-02-14

## (undated) RX ORDER — FENTANYL CITRATE 50 UG/ML
INJECTION, SOLUTION INTRAMUSCULAR; INTRAVENOUS
Status: DISPENSED
Start: 2024-02-14

## (undated) RX ORDER — KETOROLAC TROMETHAMINE 30 MG/ML
INJECTION, SOLUTION INTRAMUSCULAR; INTRAVENOUS
Status: DISPENSED
Start: 2024-02-14